# Patient Record
Sex: MALE | Race: WHITE
[De-identification: names, ages, dates, MRNs, and addresses within clinical notes are randomized per-mention and may not be internally consistent; named-entity substitution may affect disease eponyms.]

---

## 2017-03-31 NOTE — GOP
[f 
rep st]



                                                                OPERATIVE REPORT





DATE OF OPERATION:  03/31/2017



SURGEON:  Jai Gonzalez MD



CO-SURGEON:  Blanco Alonso MD



ASSISTANT:  Ania Faria PA-C



ANESTHESIA:  General.



PREOPERATIVE DIAGNOSIS:  

1.  History of lumbar spinal fusion L3-S1 with pseudoarthrosis at L5-S1.

2.  Recurrent disc herniation with foraminal stenosis left-sided L5-S1.

3.  Intractable back pain and left lower extremity radiculopathy.

4.  Treatment refractory to nonoperative intervention.



POSTOPERATIVE DIAGNOSIS:  

1.  History of lumbar spinal fusion L3-S1 with pseudoarthrosis and hardware 
failure at L5-S1.

2.  Recurrent disc herniation with foraminal stenosis left-sided L5-S1.

3.  Intractable back pain and left lower extremity radiculopathy.

4.  Treatment refractory to nonoperative intervention.



PROCEDURE PERFORMED:  

1.  Re-do anterior L5-S1 lumbar diskectomy with exploration of spinal fusion, 
removal of interbody cage and Interbody fusion using an 18 mm, 12 degree 
perimeter PEEK cage filled with morselized autograft.

2.  Anterior lumbar fusion L5-S1 with a Medtronic Pivox plate.

3.  Use of intraoperative fluoroscopy, less than 1 hour physician time.

4.  Use of neuromonitoring.



FINDINGS:  Per imaging.



SPECIMENS:  The interbody cage was sent to Pathology for gross specimen.



ESTIMATED BLOOD LOSS:  100 mL.



INDICATIONS:  The patient is a 52-year-old gentleman who has undergone a prior 
L3-S1 fusion back in 2014.  He did well for some time and then noted worsening 
leg pain with back pain.  Imaging studies demonstrated a pseudoarthrosis at L5-
S1 with loose hardware and a recurrent disk herniation at the L5-S1 level on 
the left side with foraminal and nerve compression.  After discussion of the 
risks, benefits, and treatment alternatives, and after failing nonoperative 
intervention, we decided to proceed forth with the surgery as described above.



DESCRIPTION OF PROCEDURE:  Patient was brought to the operating theater and 
underwent general endotracheal anesthesia without complications.  He had 
Venodyne's, SHERI hose, and appropriate lines placed by Anesthesia.  The patient 
was maintained supine on the operating room table and a small bump placed under 
the small of his lumbar spine.  Using lateral fluoroscopy, Dr. Blanco Alonso 
and his team marked out an anterior abdominal incision which would give us the 
best approach to the L5-S1 level.  This was marked.  The anterior abdomen was 
then prepped and draped in usual sterile surgical fashion.  A time-out was 
completed per protocol.  The patient received antibiotics within 1 hour of 
incision. 



Dr. Alonso and his team will then dictate in a separate operative report the 
anterior approach to the L5-S1 level.  Once we confirmed the level, our team 
was called into the surgical room.  At this point, we completed a re-do L5-S1 
diskectomy with the use of the 11 blade and curettes and Kerrison punches.  We 
explored the fusion and noted that the interbody cage was in broken in several 
fragments.  We removed the previous fragmented cage and sent it off to 
Pathology for gross analysis.  We then completed a discectomy and prepared the 
cartilaginous endplates.  We measured the interbody space and placed an 18 mm, 
12 degree perimeter PEEK cage filled with morselized autograft into the L5-S1 
disk space.  We achieved excellent distraction of the foramina bilaterally in 
comparison to his pre-imaging films.  At this point, we secured a large 
Medtronic Pivox plate onto the vertebral bodies of L5 and S1 with 1 screw into 
L5 and 1 screw into S1.  There was a large osteophyte on the inferior aspect of 
the L5 vertebral body near the endplate, but I could not drill this down 
secondary to the fact that the vasculature was lying over the osteophyte and 
was tethered to this area by scar and inflammatory tissue.  AP and lateral x-
rays demonstrated excellent placement of the hardware.  



At this point, Dr. Alonso his team will dictate in a separate op report the 
abdominal closure. 



There were no complications and neuromonitoring was stable throughout.



COMPLICATIONS:  None.









Job #:  [id number]

MTDD

## 2017-03-31 NOTE — POSTOPPROG
Post Op Note


Date of Operation: 03/31/17


Surgeon: Jai Gonzalez


Assistant: SHANE Faria PAC


Anesthesia: GET(General Endotracheal)


Pre-op Diagnosis: Pseudoarthrosis, lumbar stenosis


Post-op Diagnosis: Pseudoarthrosis, lumbar stenosis


Indication: failure of medical management, pseudoarthrosis, lumbar stenosis


Procedure: L5/S1 ALIF


Inf/Abcess present in the surg proc area at time of surgery?: No


EBL: Minimal





PA Addendum





- Addendum


.: 





S: leg pain improved





O: NAD A&Ox3 MAEx4 5/5 and equal in BUE and BLE Abd soft, dressing c/d/i





A/P 53y/o male s/p L5/S1 ALIF


-Diet per general surgery


-Optimize pain management


-PT/OT


-DVT prophx: TEDs, SCDs, Lovenox okay POD1


-Post op xrays pending


-Please notify NS with any change in neuro/motor exam

## 2017-04-01 NOTE — SOAPPROG
SOAP Progress Note


Assessment/Plan: 


Assessment:


 status post anterior spine exposure L5-S1 / wound okay/ abdomen soft / 

negative flatus























Plan: clear liquids





04/01/17 08:19





Objective: 





 Vital Signs











Temp Pulse Resp BP Pulse Ox


 


 37.4 C   82   16   166/94 H  93 


 


 04/01/17 04:57  04/01/17 04:57  04/01/17 04:57  04/01/17 04:57  04/01/17 04:57








 Laboratory Results





 03/02/17 10:52 





 03/02/17 10:52 





 











 03/31/17 04/01/17 04/02/17





 05:59 05:59 05:59


 


Intake Total  5170 


 


Output Total  3500 


 


Balance  1670 














ICD10 Worksheet


Patient Problems: 


 Problems











Problem Status Onset


 


BRIAN (acute kidney injury) Acute  


 


Arthrodesis status Acute  


 


Diabetes Acute  


 


HTN (hypertension) Acute  


 


Lumbago due to displacement of intervertebral disc Acute  


 


Lumbar stenosis Acute  


 


Pain Acute

## 2017-04-01 NOTE — SOAPPROG
SOAP Progress Note


Assessment/Plan: 


Assessment:


POD1 s/p L5-S1 ALIF


-Xrays


-pain control


-diet per gen surgery


-hold lovenox at midnight sunday for surgery monday


-PT/OT


-TEDS/SCD's, lovenox


Please call with change in neuro status





























04/01/17 10:25





Subjective: 





Doing well, leg pain completely resolved, pain well controlled, No complaints 

this AM


Objective: 





 Vital Signs











Temp Pulse Resp BP Pulse Ox


 


 36.4 C   72   16   99/52 L  93 


 


 04/01/17 08:19  04/01/17 08:19  04/01/17 08:19  04/01/17 08:19  04/01/17 08:19








 Laboratory Results





 03/02/17 10:52 





 03/02/17 10:52 





 











 03/31/17 04/01/17 04/02/17





 05:59 05:59 05:59


 


Intake Total  5170 


 


Output Total  3500 


 


Balance  1670 








AandOx3, wearing CPAP, MAEWx 4 5/5





ICD10 Worksheet


Patient Problems: 


 Problems











Problem Status Onset


 


BRIAN (acute kidney injury) Acute  


 


Arthrodesis status Acute  


 


Diabetes Acute  


 


HTN (hypertension) Acute  


 


Lumbago due to displacement of intervertebral disc Acute  


 


Lumbar stenosis Acute  


 


Pain Acute

## 2017-04-01 NOTE — GOP
[f 
rep st]



                                                                OPERATIVE REPORT





DATE OF OPERATION:  03/31/2017



SURGEON:  Blanco Alonso MD



ANESTHESIA:  General endotracheal anesthesia.



ANESTHESIOLOGIST:  Dr. Chaudhari.



PREOPERATIVE DIAGNOSIS:  Spinal instability with pseudoarthrosis L5-S1 joint.



POSTOPERATIVE DIAGNOSIS:  Spinal instability with pseudoarthrosis L5-S1 joint.



PROCEDURE PERFORMED:  Anterior spine exposure of the L5-S1 joint.



FINDINGS:  



ESTIMATED BLOOD LOSS:  Negligible from my portion of the procedure.



DESCRIPTION OF PROCEDURE:  The patient was taken to the operating room and 
received a satisfactory general endotracheal anesthesia by Dr. Chaudhari.  He was 
placed in the supine position, prepped and draped in the usual sterile fashion.
  A Pfannenstiel-type lower abdominal incision was made and carried through the 
subcutaneous tissue.  Rectus sheath was incised bilaterally, and the rectus 
sheath was elevated up off the rectus muscles above and below the incision.  
The peritoneum was then entered in the midline, and the rectus muscles were 
retracted laterally.  The small bowel and colon were packed away.  The 
retroperitoneum was entered over the sacral prominence.  The sacral vessels 
were multiply hemoclipped and divided, and the L5-S1 joint space was identified 
fluoroscopically, and then further exposed.  The left common iliac vein was 
mobilized and retracted cephalad, as was the right iliac artery.  Adequate 
exposure was achieved using the Omni retractor for surgery on the anterior disk 
space.  Hemostasis was assured.  



The case was then turned over to Dr. Gonzalez for anterior fusion.  That was 
completed.  



We then closed the retroperitoneum with a running 2-0 Vicryl suture.  The 
peritoneum was closed with a running 0 Vicryl suture.  The fascia was closed 
with #1 PDS suture for the rectus sheath.  The skin was closed with skin 
staples.  The wound was infiltrated with 0.5% Marcaine. 



He tolerated the procedure quite well.



COMPLICATIONS:  No complications.



DISPOSITION:  He was taken to the recovery room in good condition.





Job #:  [id number]

MTDD

## 2017-04-02 NOTE — SOAPPROG
SOAP Progress Note


Assessment/Plan: 


Assessment:


POD2 s/p L5-S1 ALIF


-Xrays


-pain control: well controlled


-diet per gen surgery: appreciate management


-hold lovenox at midnight sunday for surgery monday


-PT/OT


-TEDS/SCD's, lovenox


Please call with change in neuro status





























04/01/17 10:25





04/02/17 10:31





Subjective: 





Doing well, pain well controlled, denies leg pain, no new complaints


Objective: 





 Vital Signs











Temp Pulse Resp BP Pulse Ox


 


 37.7 C   88   16   126/60 H  90 L


 


 04/02/17 09:23  04/02/17 09:23  04/02/17 09:23  04/02/17 09:23  04/02/17 09:23








 Laboratory Results





 03/02/17 10:52 





 03/02/17 10:52 





 











 04/01/17 04/02/17 04/03/17





 05:59 05:59 05:59


 


Intake Total 5170 750 


 


Output Total 3500 800 


 


Balance 1670 -50 








sleeping but easily arousable, AandOx3, CNII-XII intact, MAEWx4 5/5





- Pending Discharge


Pending Discharge Within 24 Hours: No





ICD10 Worksheet


Patient Problems: 


 Problems











Problem Status Onset


 


BRIAN (acute kidney injury) Acute  


 


Arthrodesis status Acute  


 


Diabetes Acute  


 


HTN (hypertension) Acute  


 


Lumbago due to displacement of intervertebral disc Acute  


 


Lumbar stenosis Acute  


 


Pain Acute

## 2017-04-02 NOTE — SOAPPROG
SOAP Progress Note


Assessment/Plan: 


Assessment:


s/p exposure for alif


Some flatus


Very sleepy


Can advance diet when more alert and passing more flatus





S: Napping, awakes but falls asleep again


CPAP in place


BS hypoactive, brace in place.  Not tender























Plan:





04/02/17 11:51





Objective: 





 Vital Signs











Temp Pulse Resp BP Pulse Ox


 


 37.7 C   88   16   126/60 H  90 L


 


 04/02/17 09:23  04/02/17 09:23  04/02/17 09:23  04/02/17 09:23  04/02/17 09:23








 Laboratory Results





 03/02/17 10:52 





 03/02/17 10:52 





 











 04/01/17 04/02/17 04/03/17





 05:59 05:59 05:59


 


Intake Total 5170 750 


 


Output Total 3500 800 


 


Balance 1670 -50 














ICD10 Worksheet


Patient Problems: 


 Problems











Problem Status Onset


 


BRIAN (acute kidney injury) Acute  


 


Arthrodesis status Acute  


 


Diabetes Acute  


 


HTN (hypertension) Acute  


 


Lumbago due to displacement of intervertebral disc Acute  


 


Lumbar stenosis Acute  


 


Pain Acute

## 2017-04-03 NOTE — SOAPPROG
SOAP Progress Note


Assessment/Plan: 


Assessment:





51yo male s/p anterior ab exposure for spine surgery





Tolerating clears but only taking in small amount because difficult to sit up 

given recent spine surgery. Passing gas. 





PE


in bed, laying flat


abdomen mild distension, staple line clean dry intact, nontender to palpation





Plan:


ADAT


gen surgery will sign off, call if any issues arise, staples out approx 12 days 

from surgery in our office or NSG. 





04/03/17 11:08





Objective: 





 Vital Signs











Temp Pulse Resp BP Pulse Ox


 


 37.2 C   84   16   104/57 L  91 L


 


 04/03/17 07:46  04/03/17 07:46  04/03/17 07:46  04/03/17 08:08  04/03/17 07:46








 Laboratory Results





 03/02/17 10:52 





 03/02/17 10:52 





 











 04/02/17 04/03/17 04/04/17





 05:59 05:59 05:59


 


Intake Total 750 250 


 


Output Total 800  


 


Balance -50 250 














ICD10 Worksheet


Patient Problems: 


 Problems











Problem Status Onset


 


BRIAN (acute kidney injury) Acute  


 


Arthrodesis status Acute  


 


Diabetes Acute  


 


HTN (hypertension) Acute  


 


Lumbago due to displacement of intervertebral disc Acute  


 


Lumbar stenosis Acute  


 


Pain Acute

## 2017-04-03 NOTE — NEUSURGPN
Assessment/Plan: 





POD3 s/p L5-S1 ALIF


-pain control: well controlled


-diet per gen surgery: appreciate management


-hold lovenox  for surgery tomorrow


-PT/OT


-TEDS/SCD's, lovenox


Please call with change in neuro status





Subjective: 


Denies any leg pain


Objective: 


NAD A&Ox3 MAEx4 5/5 and equal in BUE and BLE.


Catheter Insertion Date: 03/31/17





- Physician


Patient Seen by : Carlos





Neurosurgery Physical Exam





- Vitals, I&O, Labs





 I and O











 04/02/17 04/03/17 04/04/17





 05:59 05:59 05:59


 


Intake Total 750 250 


 


Output Total 800  


 


Balance -50 250 


 


Intake:   


 


  Oral (ml) 750 250 


 


Output:   


 


  Urine (ml) 800  


 


    Catheter 800  


 


Other:   


 


  Intake Quantity Yes Yes 





  Sufficient   


 


  Number of Voids   


 


    Catheter 2  


 


    Incontinence 1  


 


    Toilet  2 


 


  Bladder Scan Volume (ml)   


 


    Catheter 361  








 Vital Signs











Temp Pulse Resp BP Pulse Ox


 


 37.2 C   84   16   102/54 L  91 L


 


 04/03/17 07:46  04/03/17 07:46  04/03/17 07:46  04/03/17 07:46  04/03/17 07:46








 Laboratory Results





 03/02/17 10:52 





 03/02/17 10:52 











ICD10 Worksheet


Patient Problems: 


 Problems











Problem Status Onset


 


BRIAN (acute kidney injury) Acute  


 


Arthrodesis status Acute  


 


Diabetes Acute  


 


HTN (hypertension) Acute  


 


Lumbago due to displacement of intervertebral disc Acute  


 


Lumbar stenosis Acute  


 


Pain Acute

## 2017-04-04 NOTE — NEUSURGPN
Assessment/Plan: 





- I spoke with patient's ALICIA Roche over the phone and discussed the 2 

options for her father Delvis. Option 1 being let him recover here in the 

hospital and once psychosis has resolved, discharge him and bring him back for 

the stage 2 lumbar surgery in the next few weeks. Option 2 would be to proceed 

with surgery today as planned. Dr. Chaudhary of anesthesia feels it would be OK for 

Delvis to undergo general anesthesia given that his labs are ok and HCT did 

not reveal any concerning findings.





-Kaley has chosen to proceed with surgery today. Verbal consent obtained over 

the phone. Patient's mother and also aide in room witnessed. We will proceed 

with the stage 2 lumbar surgery this afternoon. D/w Dr Gonzalez as well.


Catheter Insertion Date: 04/04/17





- Physician


Discussed Patient with Dr.: Gonzalez





Neurosurgery Physical Exam





- Vitals, I&O, Labs





 I and O











 04/03/17 04/04/17 04/05/17





 05:59 05:59 05:59


 


Intake Total 250 400 


 


Output Total  2320 1600


 


Balance 250 -1920 -1600


 


Intake:   


 


  Oral (ml) 250 400 


 


Output:   


 


  Urine (ml)  2320 1600


 


    Catheter  2320 1600


 


Other:   


 


  Intake Quantity Yes Yes 





  Sufficient   


 


  Number of Voids   


 


    Catheter   1


 


    Toilet 2  


 


  Number of Stools   


 


    Toilet  1 








 Vital Signs











Temp Pulse Resp BP Pulse Ox


 


 37.0 C   87   18   151/78 H  92 


 


 04/04/17 11:44  04/04/17 11:44  04/04/17 11:44  04/04/17 11:44  04/04/17 11:44








 Laboratory Results





 04/04/17 07:50 





 04/04/17 06:45 











ICD10 Worksheet


Patient Problems: 


 Problems











Problem Status Onset


 


BRIAN (acute kidney injury) Acute  


 


Arthrodesis status Acute  


 


Diabetes Acute  


 


HTN (hypertension) Acute  


 


Lumbago due to displacement of intervertebral disc Acute  


 


Lumbar stenosis Acute  


 


Pain Acute

## 2017-04-04 NOTE — POSTOPPROG
Post Op Note


Date of Operation: 04/04/17


Surgeon: Diane Marshall


Assistant: PRINCE Marshall PA-C


Anesthesiologist: Dr Alexei Chaudhary


Anesthesia: GET(General Endotracheal)


Pre-op Diagnosis: lumbar pseudoarthrosis, adjacent level breakdown


Post-op Diagnosis: same


Indication: hardware failure, pain, non-union


Procedure: L4-S1 hardware removal, L34 TLIF, L3-S1 posterior fusion


Findings: loose S1 hardware, DDD


Inf/Abcess present in the surg proc area at time of surgery?: No


Depth: Organ Space


EBL: 100-500


Complications: 





none


Drains: Shaquille Harry (x1)


Specimen(s): 





none





PA Addendum





- Addendum


.: 





S: Pt awake in PACU, c/o back pain





O:


Awake but sleepy


NAD


VSS


MAEx4


Motor 5/5 BUE/BLE - poor effort


A & P incisions dressed cdi


JPx1


Ha in 





A: 51 yo M s/p L4-S1 hardware removal, L34 TLIF, L3-S1 posterior fusion





P:


PT/OT


Pain management


Brace when OOB


Post op xrays pending


DC ha in AM


Follow BRANDON output


Advance diet as tolerated


TEDs, SCDs, lovenox POD#1


Call NS with any issues

## 2017-04-04 NOTE — NEUSURGPN
Assessment/Plan: 





POD4 s/p L5-S1 ALIF


-pain control: well controlled


- patient with some confusion this morning and hallucinations.  He is non-focal 

and able to communicate but sleepy throughout the conversation.  Appears to be 

over medicated.  The patient's mother and anesthesia agree.  The one medication 

that was given this morning was Valium and may be contributing.


- We will check STAT head CT to ensure no stroke or other intracranial issues., 

as well as a UA and CBC.   His lytes appears to be fine.


- We will delay his surgery this morning and plan for later this afternoon and 

see if his confusion clears up.


- 





Subjective: 


Denies any leg pain


Objective: 


NAD A&Ox3 MAEx4 5/5 and equal in BUE and BLE.


Confused to date and location but is able to recognize his mother.  


Exam is non-focal.


Urinary Catheter in Place: Yes


Urinary Catheter Indication: Other (Use Comment) (to OR for stage II surgery.)


Catheter Insertion Date: 04/04/17





- Physician


Patient Seen by : Carlos





Neurosurgery Physical Exam





- Vitals, I&O, Labs





 I and O











 04/03/17 04/04/17 04/05/17





 05:59 05:59 05:59


 


Intake Total 250 400 


 


Output Total  2320 


 


Balance 250 -1920 


 


Intake:   


 


  Oral (ml) 250 400 


 


Output:   


 


  Urine (ml)  2320 


 


    Catheter  2320 


 


Other:   


 


  Intake Quantity Yes Yes 





  Sufficient   


 


  Number of Voids   


 


    Toilet 2  


 


  Number of Stools   


 


    Toilet  1 








 Vital Signs











Temp Pulse Resp BP Pulse Ox


 


 37.3 C   98   16   149/90 H  94 


 


 04/03/17 23:23  04/04/17 03:54  04/04/17 03:54  04/04/17 03:54  04/04/17 03:54








 Laboratory Results





 04/04/17 06:45 





 04/04/17 06:45 











ICD10 Worksheet


Patient Problems: 


 Problems











Problem Status Onset


 


BRIAN (acute kidney injury) Acute  


 


Arthrodesis status Acute  


 


Diabetes Acute  


 


HTN (hypertension) Acute  


 


Lumbago due to displacement of intervertebral disc Acute  


 


Lumbar stenosis Acute  


 


Pain Acute

## 2017-04-05 NOTE — NEUSURGPN
Date of Surgery: 04/04/17


Post Op Day: 1


Assessment/Plan: 


Assessment: 53 yo M s/p L4-S1 hardware removal, L3/4 TLIF, L3-S1 posterior 

fusion  POD #1





Plan:


-s/p TLIF L3-S1: pt states expected lower back pain, legs feel fine


-no confused this am, AAO x 4


-PT/OT-CPM


-Pain management


-Brace when OOB


-Post op xrays pending


-DC ha this AM


-Follow BRANDON output


-Advance diet as tolerated


-TEDs, SCDs, lovenox POD#1


-Call NS with any issues





Subjective: 


Awake and alert.  NAD.  Eating/drinking and voiding.  No f/c/n/v/d.  


Objective: 


Awake and alert.  AAO x 4, NAD


AFVSS/MAEx4


Motor 5/5 BUE/BLE


A & P incisions dressed cdi


JPx1


Ha in-to be removed this am


Neuro Check Frequency: per routine


Urinary Catheter in Place: Yes


Urinary Catheter Indication: Other (Use Comment) (to be removed this am)


Catheter Insertion Date: 04/04/17





- Physician


Discussed Patient with : Carlos





Neurosurgery Physical Exam





- Vitals, I&O, Labs





 I and O











 04/04/17 04/05/17 04/06/17





 05:59 05:59 05:59


 


Intake Total 400 3550 


 


Output Total 2320 3855 


 


Balance -1920 -305 


 


Intake:   


 


  Oral (ml) 400 1000 


 


  IV Intake (ml)  1750 


 


  IV Infused (ml)  800 


 


    NS W/ 20 KCl/L 1,000 ml @  700 





    75 mls/hr IV CONT NUBIA Rx   





    #:[id number]   


 


    ceFAZolin 1 GM/DEXTROSE  100 





    50 ml @ 200 mls/hr IV Q8H   





    NUBIA Rx#:[id number]   


 


Output:   


 


  Urine (ml) 2320 3350 


 


    Catheter 2320 3350 


 


  Estimated Blood Loss (ml)  100 


 


  Wound Drainage (ml)  405 


 


    Left Back Shaquille Harry  405 


 


Other:   


 


  Intake Quantity Yes  





  Sufficient   


 


  Number of Voids   


 


    Catheter  1 


 


  Number of Stools   


 


    Toilet 1  








 Vital Signs











Temp Pulse Resp BP Pulse Ox


 


 36.9 C   90   17   146/85 H  97 


 


 04/05/17 04:00  04/05/17 04:00  04/05/17 04:00  04/05/17 04:00  04/05/17 04:00








 Laboratory Results





 04/05/17 07:10 











ICD10 Worksheet


Patient Problems: 


 Problems











Problem Status Onset


 


BRIAN (acute kidney injury) Acute  


 


Arthrodesis status Acute  


 


Diabetes Acute  


 


HTN (hypertension) Acute  


 


Lumbago due to displacement of intervertebral disc Acute  


 


Lumbar stenosis Acute  


 


Pain Acute

## 2017-04-05 NOTE — GOP
[f 
rep st]



                                                                OPERATIVE REPORT





DATE OF OPERATION:  04/04/2017



SURGEON:  Jai Gonzalez MD



ASSISTANT:  First rodriguez, Diane Marshall, SARANYA.



ANESTHESIA:  General.



PREOPERATIVE DIAGNOSIS:  

1.  L5-S1 pseudoarthrosis, status post prior L4-S1 fusion.

2.  Low back pain.

3.  Left lower extremity radiculopathy.

4.  Treatment refractory to nonoperative intervention.



POSTOPERATIVE DIAGNOSIS:  

1.  L5-S1 pseudoarthrosis, status post prior L4-S1 fusion.

2.  Low back pain.

3.  Left lower extremity radiculopathy.

4.  Treatment refractory to nonoperative intervention.

5.  Hardware failure.



SURGERY:  Please note this is stage II of a 2-staged planned surgery.  Stage I 
was an anterior lumbar interbody revision surgery at L5-S1.  He now presents 
for posterior fusion stabilization.



PROCEDURE PERFORMED:  

1.  Posterior arthrodesis with approach at L3, L4, L5, and S1.

2.  Posterolateral fusion with new bilateral pedicle screw placement into L3 
from the [employer] Solera system.

3.  Removal and replacement of bilateral pedicle screws in L4, L5, S1 from the 
[employer] Solera system.

4.  Posterolateral fusion on the right between L3 and L4 with morselized 
autograft and allograft.

5.  Left-sided L3-L4 hemilaminotomy with mesial facetectomy, foraminotomy, and 
nerve decompression.

6.  Left-sided L3-L4 transforaminal lumbar interbody fusion with a 10 x 20 mm 
titanium PEEK elevate cage with morselized autograft and allograft.

7.  Exploration of prior lumbar fusion, L4 through S1.

8.  Use of intraoperative 3D Stealth navigation.

9.  Use of intraoperative fluoroscopy, less than 1-hour physician time.

10.  Use of neuromonitoring.

11.  Use of the operating microscope.

12.  Injection of preservative-free intrathecal narcotics.

13.  Placement of bone cement around the left S1 pedicle screw.



FINDINGS:  There was malfunction of the hardware on the right side at L5-S1 
level, with pseudoarthrosis.



SPECIMENS:  None.



ESTIMATED BLOOD LOSS:  150 mL.



INDICATIONS:  The patient is a 52-year-old gentleman, who has undergone a prior 
L4 through S1 fusion several years ago.  He presented with worsening left lower 
extremity radiculopathy with low back pain.  He was found to have a 
pseudoarthrosis at L5-S1 with loose hardware.  Plan was to undergo a redo 
anterior lumbar interbody fusion at L5-S1 as a staged procedure, which was 
completed several days ago.  He presents now for the second stage.  The patient 
was noted to be somewhat confused prior to induction of anesthesia for stage II
; however, we spoke extensively with the patient's POA as well as his mother, 
and felt that this was secondary to the patient not being able to take his 
psychiatric medications as scheduled from his prior n.p.o. status and anterior 
lumbar surgery.  We decided to proceed forth with surgical intervention as 
described above.  The patient already consented for the procedure on the day 
before, when he was lucid.



DESCRIPTION OF PROCEDURE:  Patient brought to the operating theater and 
underwent general endotracheal anesthesia without complications.  He had 
Venodynes, SHERI hose, and appropriate lines placed.  He was flipped prone onto 
the Shaquille table and all bony processes inspected and padded.  The previous 
lumbar incision was identified, and prepped and draped in the usual sterile 
surgical fashion.  A time-out was completed per protocol, and the patient 
received antibiotics within 1 hour of incision. 



Using lateral fluoroscopy and a spinal needle, we then picked our entry point 
at the L3 through S1 levels.  This was marked in the midline.  The incision was 
then infiltrated with Marcaine with epinephrine.  The incision was taken down 
through with the scalpel blade.  Then using the monopolar, the incision was 
taken down in the midline through the lumbodorsal fascia, to the spinous 
process of L3.  We then skived off laterally and completed a subperiosteal 
dissection at L3-L4, and identified the prior hardware at the L4 levels.  We 
then exposed the remainder of the hardware at L4, L5, and S1 bilaterally.  The 
right-sided S1 cap screw was noted to be floating in the soft tissues, and this 
was subsequently removed.  We then explored the hardware, which was noted to be 
consistent with a pseudoarthrosis at the L5-S1 level.  At this point, we 
sequentially removed the cap screws from the bilateral L4, L5, and left S1 
levels, as well as the bilateral rods from the L4-S1 levels.  We then 
sequentially removed the bilateral pedicle screws from L4, L5, and S1.  The 
bilateral S1 screws were noted to be quite loose consistent with the haloing on 
his CT scan, and we were able to pull these out easily.  At this point, we 
replaced the bilateral L4 screws with 7.5 x 45 mm screws, as well as 
bilaterally at L5 with 7.5 x 45 mm screws.  We then placed an 8.5 x 45 mm screw 
on the right S1.  We used bone cement from the [employer] system and injected it 
into the left S1 screw hole, at which point we placed a 9.5 x 40 mm screw into 
the left S1 hole.  



At this point, we attached the 3D Stealth navigation clamp to the spinous 
process of L4 and completed a 3D Stealth navigation spin.  Using 3D Stealth 
navigation, we placed the  holes for the bilateral L3 pedicle screws.  
Both holes were manually palpated with evidence of  any cortical breaches.  We 
placed a 6.5 x 60 mm screw in the left L3, and a 6.5 x 50 mm screw into the 
right L3.  Another 3D Stealth navigation spin demonstrated good placement of 
the hardware.  Next, the microscope was brought into the field to assist with 
microscopic dissection and to maintain illumination and magnification.  Using 
the bur tip on the drill bit, Leksell rongeur, and Kerrison punches, we 
completed a left-sided L3-L4 hemilaminotomy with mesial facetectomy and 
resection of the pars, as well as foraminotomy.  We distracted the L3-L4 disk 
space and completed a left-sided L3-L4 diskectomy.  We prepared the 
cartilaginous endplates and measured interbody space.  We placed a 10 x 20 mm 
titanium PEEK elevated cage filled with morselized autograft and allograft 
anteriorly and towards the midline.  We packed additional morcellized autograft 
into the disk space for the interbody fusion.  



We let down distraction and decorticated the bone on the right side between L3 
and L4.  We placed 2 lordotic rods into the heads of the screws between L3 and 
S1 and secured them down with cap screws which were tightened to the 
's setting.  We placed morselized autograft and allograft on the 
right side between L3 and L4 for the posterolateral fusion.  We irrigated the 
wound copiously with bacitracin irrigation, and injected preservative-free 
intrathecal narcotics.  A drain was left in the subfascial space, and the wound 
then closed in multiple layers using Vicryl sutures in the deep layers and 
Dermabond for the skin.  The patient's wounds were dressed sterilely.  He was 
then flipped supine onto the transfer cart, and was still asleep at the time of 
this dictation.  



There were no complications and no noted changes on neuromonitoring throughout 
the procedure.



COMPLICATIONS:  None.





Job #:  [id number]

PILI

## 2017-04-06 NOTE — NEUSURGPN
Assessment/Plan: 





Assessment: 51 yo M s/p L4-S1 hardware removal, L3/4 TLIF, L3-S1 posterior 

fusion  POD #2





Plan:


-s/p TLIF L3-S1: pt states expected lower back pain, legs feel fine


-no confused this am, AAO x 4


-PT/OT-CPM


-Pain management


-Brace when OOB


-Post op xrays show stable hardware


-Follow BRANDON output - likely remove this afternoon


-Advance diet as tolerated


-TEDs, SCDs, on lovenox 


-Call NS with any issues


-D/w Dr Gonzalez this am. Pt seen by Dr Gonzalez as well this AM.





Subjective: 


Pt resting in bedside chair. Remembers me from the office. States he is doing 

well. No leg symptoms.


Objective: 


AAOx3


NAD


VSS


MAEx4


Motor 5/5 BLE


Incision dressed - some bloody dc on dressing. 


JPx1


Urinary Catheter in Place: No


Catheter Insertion Date: 04/04/17





- Physician


Discussed Patient with : Carlos


Patient Seen by : Carlos





Neurosurgery Physical Exam





- Vitals, I&O, Labs





 I and O











 04/05/17 04/06/17 04/07/17





 05:59 05:59 05:59


 


Intake Total 3550 1650 


 


Output Total 3855 1070 


 


Balance -305 580 


 


Intake:   


 


  Oral (ml) 1000 1650 


 


  IV Intake (ml) 1750  


 


  IV Infused (ml) 800  


 


    NS W/ 20 KCl/L 1,000 ml @ 700  





    75 mls/hr IV CONT NUBIA Rx   





    #:[id number]   


 


    ceFAZolin 1 GM/DEXTROSE 100  





    50 ml @ 200 mls/hr IV Q8H   





    NUBIA Rx#:[id number]   


 


Output:   


 


  Urine (ml) 3350 900 


 


    Catheter 3350  


 


    Toilet  900 


 


  Estimated Blood Loss (ml) 100  


 


  Wound Drainage (ml) 405 170 


 


    Left Back Shaquille Harry 405 170 


 


Other:   


 


  Number of Voids   


 


    Catheter 1  


 


    Toilet  2 








 Vital Signs











Temp Pulse Resp BP Pulse Ox


 


 37.9 C   77   16   134/65 H  91 L


 


 04/06/17 04:00  04/06/17 04:00  04/06/17 04:00  04/06/17 04:00  04/06/17 04:00








 Laboratory Results





 04/05/17 07:10 





 04/05/17 07:10 











ICD10 Worksheet


Patient Problems: 


 Problems











Problem Status Onset


 


BRIAN (acute kidney injury) Acute  


 


Arthrodesis status Acute  


 


Diabetes Acute  


 


HTN (hypertension) Acute  


 


Lumbago due to displacement of intervertebral disc Acute  


 


Lumbar stenosis Acute  


 


Pain Acute

## 2017-04-07 NOTE — NEUSURGPN
Assessment/Plan: 





Assessment: 51 yo M s/p L4-S1 hardware removal, L3/4 TLIF, L3-S1 posterior 

fusion  POD #3





Plan:


-s/p TLIF L3-S1: pt states expected lower back pain, legs feel fine


-no confused this am, AAO x 4


-PT/OT-CPM


-Pain management


-Brace when OOB - getting new brace fit, his old one from a prior sx is too 

small


-Post op xrays show stable hardware


-Daily dressing changes


-Advance diet as tolerated


-TEDs, SCDs, on lovenox 


-Call NS with any issues


-D/w Dr Gonzalez this am. 


-Dispo: OK for DC to rehab/SNF once placement obtained





Subjective: 


Pt resting in bed, states he is just waking up. Pain is well managed. Agrees 

his brace is too small and ok with getting a new one.


Objective: 


AAOx3


NAD


VSS


MAEx4


Motor 5/5 BLE


A & P incisions dressed


+LT


Urinary Catheter in Place: No


Catheter Insertion Date: 04/04/17





- Physician


Discussed Patient with : Carlos





Neurosurgery Physical Exam





- Vitals, I&O, Labs





 I and O











 04/06/17 04/07/17 04/08/17





 05:59 05:59 05:59


 


Intake Total 1650 500 


 


Output Total 1070 820 


 


Balance 580 -320 


 


Intake:   


 


  Oral (ml) 1650 500 


 


Output:   


 


  Urine (ml) 900 800 


 


    Toilet 900 800 


 


  Wound Drainage (ml) 170 20 


 


    Left Back Shaquille Harry 170 20 


 


Other:   


 


  Intake Quantity  Yes 





  Sufficient   


 


  Number of Voids   


 


    Toilet 2 2 


 


  Number of Stools   


 


    Toilet  1 








 Microbiology











 04/04/17 19:30 Urine Culture - Final





 Urine,Catheterized 








 Vital Signs











Temp Pulse Resp BP Pulse Ox


 


 37.2 C   79   16   120/67   95 


 


 04/07/17 04:00  04/07/17 04:00  04/07/17 04:00  04/07/17 04:00  04/07/17 04:00








 Laboratory Results





 04/05/17 07:10 





 04/05/17 07:10 











ICD10 Worksheet


Patient Problems: 


 Problems











Problem Status Onset


 


BRIAN (acute kidney injury) Acute  


 


Arthrodesis status Acute  


 


Diabetes Acute  


 


HTN (hypertension) Acute  


 


Lumbago due to displacement of intervertebral disc Acute  


 


Lumbar stenosis Acute  


 


Pain Acute

## 2017-04-07 NOTE — PDIAF
- Diagnosis


Code Status: Full Code





- Medication Management


Discharge Medications: 


 Medications to Continue on Transfer





Ascorbic Acid [Vitamin C 500 mg (*)] 1,000 mg PO DAILY@0700 09/17/15 [Last 

Taken 03/23/17]


Benztropine Mesylate [Cogentin] 0.5 mg PO BID@0700,2100 09/17/15 [Last Taken 03/ 30/17]


Fluticasone Nasal [Flonase Nasal Spray] 1 sprays EACHNARE DAILY PRN 09/17/15 [

Last Taken 10/06/15]


Gabapentin [Neurontin 300 MG (*)] 600 mg PO TID@0700,1400,2100 09/17/15 [Last 

Taken 03/30/17]


Metoprolol Tartrate [Lopressor 100 mg (*)] 100 mg PO BID@07,18 09/17/15 [Last 

Taken 03/30/17]


Propylene Glycol [Systane Balance] 1 drop EACHEYE PRN PRN 09/17/15 [Last Taken 

03/23/17]


amLODIPine BESYLATE [Norvasc 10 mg (*)] 10 mg PO DAILY@07 09/17/15 [Last Taken 

03/30/17]


clonazePAM [Klonopin (*)] 0.5 mg PO TID@0700,1400,1800 09/17/15 [Last Taken 03/ 30/17]


tiZANidine HCL [Zanaflex 2MG (*)] 4 mg PO BID@0700,2100 09/17/15 [Last Taken 03/ 30/17]


Doxazosin Mesylate [Cardura 4 MG (*)] 8 mg PO HS #30 tab 10/09/15 [Last Taken 03 /30/17]


Allopurinol [Allopurinol 300 MG (RX)] 300 mg PO BID@07,18 02/17/17 [Last Taken 

03/30/17]


Amantadine HCl [Symmetrel] 100 mg PO HS 02/17/17 [Last Taken 03/30/17]


Atorvastatin Calcium [Lipitor 20 mg (*)] 20 mg PO HS 02/17/17 [Last Taken 03/30/ 17]


Colchicine [Colchicine (*)] 1.8 mg PO DAILY PRN 02/17/17 [Last Taken 03/30/17]


Dextroamphetamine/Amphetamine [Adderall 30 mg Tablet] 30 mg PO BID@07,12 02/17/ 17 [Last Taken 03/30/17]


Divalproex Sodium [Depakote] 1,000 mg PO BID@1600,2000 02/17/17 [Last Taken 03/ 30/17]


Lisinopril [Zestril 40 mg (*)] 40 mg PO DAILY 02/17/17 [Last Taken 03/30/17]


Melatonin 10 mg PO HS 02/17/17 [Last Taken 03/23/17]


Ziprasidone HCl [Geodon] 80 mg PO 07,14,18,21 02/17/17 [Last Taken 03/30/17]


metFORMIN HCL [Glucophage 1000 mg] 1,000 mg PO BIDMEAL 02/17/17 [Last Taken 03/ 29/17]


HYDROcodone/APAP 10/325 [Norco 10/325 (*)] 1 tab PO Q4 PRN #90 tab 04/07/17 [

Last Taken Unknown]


Methocarbamol [Robaxin 750 mg (*)] 750 mg PO QID PRN #0 tab 04/07/17 [Last 

Taken Unknown]


Sennosides/Docusate Sodium [Senokot-S] 1 - 2 tab PO BID #0 tab 04/07/17 [Last 

Taken Unknown]








Discharge Medications: Refer to the Discharge Home Medication list for PRN 

reason.


PICC Care - Routine: N/A





- Orders


Services needed: Registered Nurse, Physical Therapy, Occupational Therapy


Diet Recommendation: no restrictions on diet


Diet Texture: Regular Texture Diet


Wound Care Instructions: Staples in abdominal wall should come out around April 13-15th, this can be done by Dr Alonso office, rehab, or neurosurgery office. If 

coming to Dr Alonso office please call [phone] to make appointment with [facility] 

or [facility]. Ok to shower over staples, no need to cover.





- Follow Up Care


Current Providers and Referrals: 


Kannan Car MD [Primary Care Provider] - 


Jia Gonzalez MD [Medical Doctor] - follow up in 2 weeks

## 2018-04-30 NOTE — PDRADPN
Radiology Procedure Note


Date of Procedure: 04/30/18


Radiologist: Onesimo Mariano


Anesthesiologist: Dr. Martins


Anesthesia: IV Sedation


Pre-op Diagnosis: post 5/1 discectomy.


Post-op Diagnosis: same


Indication: concern for infected 5/1 disc prosthesis


Procedure: ct guided diagnostic disc aspiration


Finding(s): right parapedicular approach to 5/1 disc space with coaxial 18/20 

chiba combination. Three 20G FNAs obtained.


Inf/Abcess present in the surg proc area at time of surgery?: No


EBL: Minimal


Complications: 





none


Specimen(s): 





Three 20G aspirates submitted in dry plastic specimen container.

## 2018-04-30 NOTE — PDANEPAE
ANE History of Present Illness





CT Guided Biopsy L5/S1 Disc





ANE Past Medical History





- Cardiovascular History


Hx Hypertension: Yes


Hx Arrhythmias: No


Hx Chest Pain: No


Hx Coronary Artery / Peripheral Vascular Disease: No


Hx CHF / Valvular Disease: No


Hx Palpitations: No





- Pulmonary History


Hx COPD: No


Hx Asthma/Reactive Airway Disease: No


Hx Recent Upper Respiratory Infection: No


Hx Oxygen in Use at Home: No


Hx Sleep Apnea: Yes


Sleep Apnea Screening Result - Last Documented: Positive


Pulmonary History Comment: Sleep apnea w/ CPAP;





- Neurologic History


Hx Cerebrovascular Accident: No


Hx Seizures: No


Hx Dementia: No





- Endocrine History


Hx Diabetes: Yes


Endocrine History Comment: DM II FOR THE PAST 5 OR 6 YRS AGO





- Renal History


Hx Renal Disorders: No


Renal History Comment: CHILDHOOD KIDNEY SURG





- Liver History


Hx Hepatic Disorders: No





- Neurological & Psychiatric Hx


Hx Neurological and Psychiatric Disorders: Yes


Neurological / Psychiatric History Comment: BIPOLAR W/PSYCHOTIC TENDENCY;





- Cancer History


Hx Cancer: No





- Congenital Disorder History


Hx Congenital Disorders: Yes


Congenital History Comment: KIDNEY CONGENITAL ABNORMALITY





- GI History


Hx Gastrointestinal Disorders: No


Gastrointestinal History Comment: Gas;





- Other Health History


Other Health History: NEG





- Chronic Pain History


Chronic Pain: Yes (BACK PAIN & SHOULDER L; b leg)





- Surgical History


Prior Surgeries: L4/5, L5/S1 FUSION W/ INSTRUMENTATION 10/2015.  ING HERNIA 

REPAIR L.  KIDNEY SURG CHILDHOOD;.  SHOULDER R REPLACEMENT.  VASECTOMY.  

COLONOSCOPIES X2.  MASSES REMOVED FROM CHEST AREA;





ANE Review of Systems


Review of Systems: 








- Exercise capacity


METS (RN): 3 METS





ANE Patient History





- Allergies


Allergies/Adverse Reactions: 








carbamazepine [From Tegretol] Allergy (Verified 09/17/15 13:24)


 Hives


ramelteon [From Rozerem] Allergy (Verified 09/17/15 13:24)


 Hives


vortioxetine hydrobromide [From Brintellix] Allergy (Verified 09/17/15 13:24)


 Hives


anticonvulsant Allergy (Uncoded 04/30/18 11:08)


 








- Home Medications


Home Medications: 








Ascorbic Acid [Vitamin C 500 mg (*)] 1,000 mg PO DAILY@0700 09/17/15 [Last 

Taken 03/23/17]


Benztropine Mesylate [Cogentin] 0.5 mg PO DAILY 09/17/15 [Last Taken 03/30/17]


Gabapentin [Neurontin 300 MG (*)] 600 mg PO TID@0700,1400,2100 09/17/15 [Last 

Taken 03/30/17]


Metoprolol Tartrate [Lopressor 100 mg (*)] 100 mg PO BID@07,18 09/17/15 [Last 

Taken 03/30/17]


Propylene Glycol [Systane Balance] 1 drop EACHEYE PRN PRN 09/17/15 [Last Taken 

03/23/17]


amLODIPine BESYLATE [Norvasc 10 mg (*)] 10 mg PO DAILY@07 09/17/15 [Last Taken 

03/30/17]


clonazePAM [Klonopin (*)] 0.5 mg PO TID@0700,1400,1800 09/17/15 [Last Taken 03/ 30/17]


Allopurinol [Allopurinol 300 MG (RX)] 300 mg PO BID@07,18 02/17/17 [Last Taken 

03/30/17]


Amantadine HCl [Symmetrel] 30 mg PO BID 02/17/17 [Last Taken 03/30/17]


Atorvastatin Calcium [Lipitor 20 mg (*)] 20 mg PO HS 02/17/17 [Last Taken 03/30/ 17]


Colchicine [Colchicine (*)] 1.8 mg PO DAILY PRN 02/17/17 [Last Taken 03/30/17]


Dextroamphetamine/Amphetamine [Adderall 30 mg Tablet] 30 mg PO BID@07,12 02/17/ 17 [Last Taken 03/30/17]


Divalproex Sodium [Depakote] 1,000 mg PO BID@1600,2000 02/17/17 [Last Taken 03/ 30/17]


Lisinopril [Zestril 40 mg (*)] 40 mg PO DAILY 02/17/17 [Last Taken 03/30/17]


Melatonin 5 mg PO HS 02/17/17 [Last Taken 03/23/17]


Ziprasidone HCl [Geodon] 80 mg PO BID 02/17/17 [Last Taken 03/30/17]


metFORMIN HCL [Glucophage 1000 mg] 1,000 mg PO BIDMEAL 02/17/17 [Last Taken 03/ 29/17]


Diclofenac Potassium 50 mg PO BID 04/27/18 [Last Taken Unknown]


Doxazosin Mesylate [Cardura 4 MG (*)] 8 mg PO DAILY 04/27/18 [Last Taken Unknown

]


Fish Oil 1000 mg (*) 1,600 mg PO BID 04/27/18 [Last Taken Unknown]


Furosemide 40 mg PO DAILY 04/27/18 [Last Taken Unknown]


Glucosamine-Chondroitin Tablet 1,500 mg PO BID 04/27/18 [Last Taken Unknown]


Ipratropium 0.06% Nasal 1 spray TID 04/27/18 [Last Taken Unknown]


Methocarbamol [Robaxin 750 mg (*)] 750 mg PO BID PRN 04/27/18 [Last Taken 

Unknown]


Multivitamins 1 tab PO DAILY 04/27/18 [Last Taken Unknown]


Preservision Areds 2 Softgel PO BID 04/27/18 [Last Taken Unknown]


Tylenol  mg (*) 1,000 mg PO BID 04/27/18 [Last Taken Unknown]


Vitamin D3 2,000 iunits PO DAILY 04/27/18 [Last Taken Unknown]


Vraylar 4.5 mg PO DAILY 04/27/18 [Last Taken Unknown]








- Smoking Hx


Smoking Status: Former smoker





- Family Anes Hx


Family Hx Anesthesia Complications: NEG





ANE Labs/Vital Signs





- Labs


Result Diagrams: 


 04/30/18 10:45








- Vital Signs


Blood Pressure: 158/95


Heart Rate: 72


Respiratory Rate: 18


O2 Sat (%): 94


Height: 182.88 cm


Weight: 141.067 kg





ANE Physical Exam





- Airway


Neck exam: FROM


Mallampati Score: Class 3


Mouth exam: normal dental/mouth exam





- Pulmonary


Pulmonary: clear to auscultation





- Cardiovascular


Cardiovascular: regular rate and rhythym





- ASA Status


ASA Status: III





ANE Anesthesia Plan


Anesthesia Plan: GA with mask

## 2018-04-30 NOTE — PDGENHP
History & Physical


Chief Complaint: suspected L5/S1 hardware infection


History of Present Illness: post revision fusion. CT findings worrisome for 

infection at 5/1 disc space


Relevant Physical Exam: nad


Cardiorespiratory Assessment: rrr, nl wob

## 2018-04-30 NOTE — PDPROPOC
Sedation Plan of Care


Sedation Plan of Care: mental status noted, patient educated of risks, benefits

, alternatives, patient can tolerate sedation


ASA Classification: ASA 3


Planned drugs: other (per anaesthesia)


Mallampati Score: Class 3


Mallampati Reference Image:

## 2018-06-22 NOTE — POSTOPPROG
Post Op Note


Date of Operation: 06/22/18


Surgeon: Ania Faria


Assistant: SHANE Faria PAC


Anesthesia: GET(General Endotracheal)


Pre-op Diagnosis: lumbar pseudoarthoresis, stenosis


Post-op Diagnosis: lumbar pseudoarthoresis, stenosis


Indication: lumbar pseudoarthoresis, stenosis


Procedure: Stage 1 Redo L5/S1 ALIF


Inf/Abcess present in the surg proc area at time of surgery?: No


EBL: Greater than 1000


Drains: Shaquille MCKINNON Addendum





- Addendum


.: 





S: resting comfortably





O: NAD A&Ox3


MAEx4 5/5 and equal in BUE and BLE





A/P 54y/o male s/p Stage I: Redo L5/S1 ALIF with partial L5 corepectomy; Stage 

2 postponed due to EBL 





-TO ICU, likely to return to OR for Stage II this weekend


-Optimize pain management


-Restart patient psych meds


-Diet per gen surgery


-DVT prophx: TEDs, SCDs, 


-PT/OT


-Please notify NS with any change in neuro/motor exam

## 2018-06-22 NOTE — GOP
[f 
rep st]



                                                                OPERATIVE REPORT





DATE OF OPERATION:  06/22/2018



SURGEON:  Jai Gonzalez MD



CO-SURGEON:  Dr. Blanco Alonso.



ASSISTANT:   Ania Faria P.A.-C.



COMPLICATIONS:  None.



ANESTHESIA:  General.



PREOPERATIVE DIAGNOSIS:  

1.  L5-S1 pseudoarthrosis with broken hardware at bilateral S1 and loose 
hardware at L5 and S1.

2.  History of prior spinal fusion x2 with posterior fusion and anterior lumbar 
fusion L5-S1.

3.  Progressive low back pain.

4.  Progressive lower extremity radiculopathy.

5.  Treatment refractory to nonoperative intervention.



POSTOPERATIVE DIAGNOSIS:  

1.  L5-S1 pseudoarthrosis with broken hardware at bilateral S1 and loose 
hardware at L5 and S1.

2.  History of prior spinal fusion x2 with posterior fusion and anterior lumbar 
fusion L5-S1.

3.  Progressive low back pain.

4.  Progressive lower extremity radiculopathy.

5.  Treatment refractory to nonoperative intervention.



PROCEDURE PERFORMED:  

1.  Anterior arthrodesis with approach to L5-S1.

2.  Exploration of prior L5-S1 hardware with subsequent removal of L5-S1 
interbody cage.

3.  Partial L5 and partial S1 corpectomies and redo-interbody fusion using a 28 
x 26 x 24 mm Verte-Stack PEEK cage filled with morselized autograft and 
allograft.

4.  Anterior lumbar fusion L5-S1 with a 45 mm Medtronic 4 hole plate.

5.  Use of intraoperative fluoroscopy, less than 1 hour physician time.

6.  Use or neuromonitoring.



FINDINGS:  per imaging



SPECIMENS:  The interbody cage and specimens were taken from the L5-S1 disk 
space and vertebral bodies for microbiology culture and for gross specimen 
analysis.



ESTIMATED BLOOD LOSS:  2.5 L.



INDICATIONS:  The patient is a 53-year-old gentleman who has undergone prior 
multiple lumbar surgeries including L5-S1 anterior posterior revision for 
pseudoarthrosis by myself approximately 2 years ago.  The patient presented 
with worsening low back pain.  Had evidence of broken hardware on his imaging 
studies.  Imaging demonstrated S1 broken screws with pseudoarthrosis at the L5-
S1 level.  After discussion of the risks, benefits, and treatment alternatives, 
we decided to proceed forth with surgery as described above.



DESCRIPTION OF PROCEDURE:  Patient was brought to the operating theater and 
underwent general endotracheal anesthesia without complication.  He had 
Venodynes, SHERI hose and appropriate lines placed by Anesthesia.  He was 
maintained supine on the operating table with a small bump placed on the small 
of his back and his arms extended to the sides.  The lower abdominal abdomen 
was then marked, prepped and draped in the usual sterile surgical fashion.  A 
time-out was completed per protocol and the patient received antibiotics within 
1 hour of incision. 



Dr. Alonso and his team will then dictate a separate operative report the 
anterior exposure to the L5-S1 level.  This was noted to be quite tedious 
secondary to the fact the patient's tissues were all noted to be quite adhesed 
and scarred in place.  



Once he was able to expose the prior hardware and the L5 and S1 vertebral bodies
, we then removed the anterior Pivox plate and passed it off the field.  We 
then used a combination of the bur tip on the drill bit, curettes, Kerrison 
punches to complete a redo L5-S1 diskectomy.  The patient had some erosion of 
the L5 and S1 vertebral bodies and I therefore used the curettes and bur tip on 
the drill bit to complete a partial L5 and a partial S1 corpectomy until we got 
good flat surfaces on the bone.  We prepared the bone surfaces and measured the 
interbody space.  We placed a 20 x 26 x 24 mm PEEK cage filled with morselized 
autograft and allograft between the L5-S1 levels.  AP and lateral x-rays 
demonstrated good placement of the interbody hardware.  The patient's left 
iliac vessel was noted to be non-mobile and crossing over the L5 vertebral body 
and across the L5-S1 disc space.  I was therefore limited in the kind of 
anterior plate I could place across the L5-S1 space that woud also be long 
enough to span the defect.  I ended up having to use a 45 mm 4 hole plate and 
because I could not place the plate over the midline, I had to place it 
eccentrically at an angle with 1 screw placed at L5 and 1 screw placed at S1.  
I could not place screws deep enough for the anti-backout device to be rotated 
over the L5 screw and therefore had to leave it slightly proud.  I also tried 
to back it out to place a smaller screw but it would not move and I was worried 
about stripping the scrw head.  AP and lateral x-rays demonstrated good 
placement of the hardware. 



 At this point, Dr. Alonso and his team will dictate in a separate operative 
report of the abdominal closure.  By this point, the patient lost approximately 
2.5 L of blood and given that we had to still complete stage 2 posterior 
revision of the patient's hardware I opted at this point, to stop and bring him 
back for stage 2 at a separate day once we knew that he was doing well from a 
neurologic and lab standpoint. 



Please note, that this surgery is greater than 50% more difficult than the 
average surgery secondary to the fact that this was a revision and we had to go 
through scar tissue and we had poor access secondary to the scared down nature 
of the left iliac vein which is in our surgical field.









Job #:  [id number]

MTDD

## 2018-06-22 NOTE — PDHPUP
History & Physical Update


H&P update statement: 


This history and physical update is based on an assessment of the patient which 

was completed after admission or registration (within 24 hours), but prior to 

the surgery/procedure.





H&P update: H&P reviewed & patient examined, no change in patient's condition 

since H&P completed (All questions answered.  Consent signed and site marked. )

## 2018-06-22 NOTE — PDMN
Medical Necessity


Medical necessity: [facility] IP only surgery; cpt 04988 Musculoskeletal Surgery IP 

59417 Removal of Instrumentation IP (Redo L5/S1 ALIF; L2/3 TLIF, L2-S2 PSF)

## 2018-06-22 NOTE — PDANEPAE
ANE History of Present Illness


53 year old male with plans for large anterior and posterior spine surgery.





ANE Past Medical History





- Cardiovascular History


Hx Hypertension: Yes


Hx Arrhythmias: No


Hx Chest Pain: No


Hx Coronary Artery / Peripheral Vascular Disease: No


Hx CHF / Valvular Disease: No


Hx Palpitations: No





- Pulmonary History


Hx COPD: No


Hx Asthma/Reactive Airway Disease: No


Hx Recent Upper Respiratory Infection: No


Hx Oxygen in Use at Home: No


Hx Sleep Apnea: Yes


Sleep Apnea Screening Result - Last Documented: Positive


Pulmonary History Comment: Sleep apnea TOLERATES  CPAP;





- Neurologic History


Hx Cerebrovascular Accident: No


Hx Seizures: No


Hx Dementia: No


Neurologic History Comment: NO HX OF SEZURES





- Endocrine History


Hx Diabetes: Yes


Hypothyroid: No


Hyperthyroid: No


Obesity: moderate


Endocrine History Comment: DM II -DX ~2010. HGB A 1 C ~ 7.6.





- Renal History


Hx Renal Disorders: Yes


Renal History Comment: PROBLEM EMPTYING BLADDER





- Liver History


Hx Hepatic Disorders: No





- Neurological & Psychiatric Hx


Hx Neurological and Psychiatric Disorders: Yes


Neurological / Psychiatric History Comment: BACK PAIN- PAIN RADIATES DOWN BILAT 

LEGS;.  BIPOLAR W/PSYCHOTIC TENDENCY;STABLE W/RX;





- Cancer History


Hx Cancer: No





- Congenital Disorder History


Hx Congenital Disorders: Yes


Congenital History Comment: KIDNEY CONGENITAL ABNORMALITY





- GI History


Hx Gastrointestinal Disorders: No


Gastrointestinal History Comment: Gas;





- Other Health History


Other Health History: 1 MISSING TOOTH-LOWER MOLAR.





- Chronic Pain History


Chronic Pain: Yes (BACK PAIN & SHOULDER L; b leg)





- Surgical History


Prior Surgeries: L5/S1 BX/NEG FOR INFECTION 4-18;.  L4/5, L5/S1 FUSION W/ 

INSTRUMENTATION 10/2015.  ING HERNIA REPAIR L.  KIDNEY SURG CHILDHOOD;.  

SHOULDER R REPLACEMENT.  VASECTOMY.  COLONOSCOPIES X2.  MASSES REMOVED FROM 

CHEST AREA;





ANE Review of Systems


Review of Systems: 








- Exercise capacity


Exercise capacity: <4 METS


METS (RN): 3 METS





- Systems


Muscolosketal: Reports: back pain


Neurological: Reports: numbness, tingling (Bilateral legs)





ANE Patient History





- Allergies


Allergies/Adverse Reactions: 








carbamazepine [From Tegretol] Allergy (Verified 06/04/18 16:55)


 Hives


ramelteon [From Rozerem] Allergy (Verified 06/04/18 16:55)


 Hives


vortioxetine hydrobromide [From Brintellix] Allergy (Verified 06/04/18 16:55)


 Hives


anticonvulsant Allergy (Uncoded 06/04/18 16:55)


 Hives








- Home Medications


Home medications: home medication list seen and reviewed


Home Medications: 








RX: Ascorbic Acid [Vitamin C 500 mg (*)] 1,000 mg PO DAILY@0700 09/17/15 [Last 

Taken 06/08/18]


RX: Benztropine Mesylate [Cogentin] 0.5 mg PO DAILY 09/17/15 [Last Taken 06/22/ 18]


RX: Gabapentin [Neurontin 300 MG (*)] 600 mg PO TID@0700,1400,2100 09/17/15 [

Last Taken 06/22/18]


RX: Metoprolol Tartrate [Lopressor 100 mg (*)] 100 mg PO BID@07,18 09/17/15 [

Last Taken 06/21/18]


RX: amLODIPine BESYLATE [Norvasc 10 mg (*)] 10 mg PO DAILY@07 09/17/15 [Last 

Taken 06/22/18]


RX: clonazePAM [Klonopin (*)] 0.5 mg PO TID@0700,1400,1800 09/17/15 [Last Taken 

06/22/18]


RX: Allopurinol [Allopurinol 300 MG (RX)] 300 mg PO BID@07,18 02/17/17 [Last 

Taken 06/22/18]


RX: Amantadine HCl [Symmetrel] 100 mg PO BID 02/17/17 [Last Taken 06/21/18]


RX: Atorvastatin Calcium [Lipitor 20 mg (*)] 20 mg PO HS 02/17/17 [Last Taken 06 /21/18]


RX: Colchicine [Colchicine (*)] 1.8 mg PO DAILY PRN 02/17/17 [Last Taken 05/23/ 18]


RX: Dextroamphetamine/Amphetamine [Adderall 30 mg Tablet] 30 mg PO BID@07,12 02/ 17/17 [Last Taken 06/21/18]


RX: Divalproex Sodium [Depakote] 1,000 mg PO DAILY@16 02/17/17 [Last Taken 06/21 /18]


RX: Lisinopril [Zestril 40 mg (*)] 40 mg PO DAILY 02/17/17 [Last Taken 06/21/18]


RX: Melatonin 5 mg PO HS 02/17/17 [Last Taken 06/08/18]


RX: Ziprasidone HCl [Geodon] 80 mg PO TID 02/17/17 [Last Taken 06/22/18]


RX: metFORMIN HCL [Glucophage 1000 mg] 1,000 mg PO BIDMEAL 02/17/17 [Last Taken 

06/20/18]


Glucosamine-Chondroitin Tablet 1,500 mg PO BID 04/27/18 [Last Taken 06/08/18]


Ipratropium 0.06% Nasal 1 spray EACHNARE TID 04/27/18 [Last Taken 06/22/18]


RX: Doxazosin Mesylate [Cardura 4 MG (*)] 8 mg PO DAILY 04/27/18 [Last Taken 06/ 22/18]


Vraylar 4.5 mg PO DAILY@21 04/27/18 [Last Taken 06/21/18]


Acetaminophen [Tylenol  mg (*)] 1,000 mg PO BID@07,15 06/04/18 [Last 

Taken 06/22/18]


C/E/Zn/Cu/OM3/DHA/EPA/LUT/ZEAX [Preservision Areds 2 Softgel] 1 each PO BID 06/ 04/18 [Last Taken 06/08/18]


Cholecalciferol Vit D3 [Vitamin D3 (*)] 2,000 units PO DAILY 06/04/18 [Last 

Taken 06/08/18]


Divalproex [Depakote] 1,500 mg PO DAILY@08 06/04/18 [Last Taken 06/22/18]


Furosemide [Lasix 40 MG (*)] 40 mg PO DAILY PRN 06/04/18 [Last Taken 06/08/18]


Levothyroxine [Synthroid 50 mcg (*)] 50 mcg PO DAILY06 06/04/18 [Last Taken 06/ 22/18]


Multivitamins [Multivitamin (*)] 1 each PO DAILY 06/04/18 [Last Taken 06/08/18]


Omega-3 Fatty Acids [Fish Oil 1000 mg (*)] 1,000 mg PO BID 06/04/18 [Last Taken 

06/08/18]


Zolpidem Tartrate [Ambien] 10 mg PO HS 06/04/18 [Last Taken 06/21/18]


tiZANidine HCL [Zanaflex] 4 mg PO BID@07,15 06/04/18 [Last Taken 06/22/18]








- NPO status


NPO Status: no food or drink >8 hours


NPO Since - Liquids (Date): 06/21/18


NPO Since - Liquids (Time): 23:00


NPO Since - Solids (Date): 06/21/18


NPO Since - Solids (Time): 23:00





- Anes Hx


Anes Hx: no prior problems





- Smoking Hx


Smoking Status: Former smoker (Former smokeless tobacco)


Marijuana use: No





- Alcohol Use


Alcohol Use: None





- Family Anes Hx


Family Anes Hx: neg - N/A


Family Hx Anesthesia Complications: NEG





ANE Labs/Vital Signs





- Vital Signs


Vital Signs: reviewed preoperatively; see RN documention for details


Blood Pressure: 112/73


Heart Rate: 95


Respiratory Rate: 16


O2 Sat (%): 92


Height: 182.88 cm


Weight: 140.614 kg





ANE Physical Exam





- Airway


Neck exam: increased neck circumference, short neck


Mallampati Score: Class 3


Mouth exam: normal dental/mouth exam, small mouth opening





- Pulmonary


Pulmonary: no respiratory distress





- Cardiovascular


Cardiovascular: regular rate and rhythym





- ASA Status


ASA Status: III





ANE Anesthesia Plan


Anesthesia Plan: general endotracheal anesthesia


Lines/Monitors: arterial line


Total IV Anesthesia: No

## 2018-06-23 NOTE — GCON
[f rep st]



                                                                    CONSULTATION





PULMONARY/CRITICAL CARE CONSULTATION



DATE OF CONSULTATION:  06/22/2018



REFERRING PHYSICIAN:  Jai Gonzalez MD



REASON FOR REFERRAL:  Evaluation and management of hypotension and acute blood loss.



HISTORY:  The patient is a 53-year-old male with a long history of back pain, status post several henry
geries.  Because of ongoing pain not responsive to nonoperative treatment, he was admitted for lumbar
 surgery with planned anterior and posterior approaches.  During the anterior approach through the ab
domen, they encountered quite a bit of scarring/adhesions, and had an estimated blood loss of 2.5 L. 
 Because of the large amount of blood loss, the anterior approach was completed and it was decided to
 delay the posterior approach until at least tomorrow.  The patient is returned to the intensive care
 unit, extubated.  He is groggy but denies pain currently.



PAST MEDICAL HISTORY:  

1.  Obstructive sleep apnea.  The patient uses CPAP and oxygen with sleep, and is apparently complian
t with his current therapy, which is effective per recent CPAP downloads.

2.  History of bipolar disorder. 

3.  Type 2 diabetes. 

4.  Hypertension. 

5.  Hyperlipidemia.



MEDICATIONS:  At the time of admission include allopurinol, amlodipine, atorvastatin, doxazosin, devyn
pentin, Glucophage, ipratropium, lisinopril, metoprolol, ProAir, tizanidine, Adderall, amantadine, cl
onazepam, Depakote, melatonin, ziprasidone, Vraylar, and zolpidem.



ALLERGIES:  Carbamazepine, Rozerem, and Tegretol.



SOCIAL HISTORY:  The patient does not drink.  He is a former smoker.  He denies illicit substance use
.



FAMILY HISTORY:  Unremarkable.



REVIEW OF SYSTEMS:  Unobtainable due to the patient's sedation/postoperative state.



PHYSICAL EXAMINATION:  VITAL SIGNS:  Blood pressure is 89/68 with heart rate of 113.  His oxygen satu
rations are 93% on CPAP with supplemental oxygen at 5 L/minute.  He is afebrile.  HEENT:  Normocephal
ic and atraumatic.  No icterus.  NECK:  No JVD.  Trachea is midline.  CHEST:  Clear to auscultation. 
 CARDIAC:  Regular rate and rhythm without murmur.  ABDOMEN:  Soft and nontender.  He is obese.  Terry
l sounds are absent.  EXTREMITIES:  No clubbing, cyanosis, or edema.  NEURO:  The patient is sedated 
and somnolent but does arouse weakly and follows simple commands.  There are no gross motor or sensor
y deficits.



LABORATORY:  A hemoglobin is 15.5.  A white blood count is 16.1, a platelet count is 175.  Chemistry 
group shows a creatinine of 1.1.  His glucose is 204.  An INR is 1.1.



ASSESSMENT:  

1.  Hypotension.  The patient had a brief episode of hypotension, likely related to large volume loss
 as well as vasoplegia due to the patient's anesthesia.  He is currently receiving IV fluids and I ex
pect this will respond to fluids.

2.  Acute blood loss.  The patient had 2.5 L of estimated blood loss.  Despite this, his hemoglobin i
s normal.  Likely, he still has some intravascular volume loss and will dilute with further IV fluids
 for volume expansion to counteract the patient's hypotension.  There is no evidence of ongoing acute
 blood loss or coagulopathy.

3.  Obstructive sleep apnea.  The patient is managed as an outpatient with continuous positive-airway
 pressure and oxygen, and this appears to be effective and he is compliant.  He is currently using co
ntinuous positive-airway pressure and oxygen.  His oxygen saturations are adequate.



RECOMMENDATIONS:  

1.  Continue CPAP and oxygen. 

2.  IV fluid boluses. 

3.  Follow hemoglobin. 

4.  If the patient does well, he can probably proceed with surgery tomorrow.





Job #:  [id number]

## 2018-06-23 NOTE — PDINTPN
Intensivist Progress Note


Assessment/Plan: 


Assessment:


Acute blood loss anemia: Had EBL 2.5 liters 6/22. Initial post-op Hgb normal, 

now this has fallen as expected. No signs of significant ongoing blood loss/

coagulopathy. 


BRIAN: Likely due to pre-renal/hypovolemia after lager acute blood loss 6/22. 

Urine output OK. 


BRYANT: On CPAP/oxygen, with good saturations.





Plan: Follow H/H, Cr. IVF for BRIAN. Continue CPAP/oxygen PRN sleep.





06/23/18 16:10





Subjective: 





Feels OK pain controlled s/p posterior L-spine surgery.


Objective: 





 Vital Signs











Temp Pulse Resp BP Pulse Ox


 


 36.9 C   91   10 L  96/46 L  94 


 


 06/23/18 14:50  06/23/18 14:50  06/23/18 14:50  06/23/18 14:50  06/23/18 14:50








 Microbiology











 06/22/18 10:53 Gram Stain - Final





 Back - Other 


 


 06/22/18 10:16 Gram Stain - Final





 Other - Other 


 


 06/22/18 10:53 Mycobacterial Smear (NATHAN) - Final





 Back - Other 








 Laboratory Results





 06/23/18 15:15 





 











 06/22/18 06/23/18 06/24/18





 05:59 05:59 05:59


 


Intake Total  5845 


 


Output Total  3110 295


 


Balance  2735 -295








 











PT  13.8 SEC (12.0-15.0)   06/22/18  16:35    


 


INR  1.04  (0.83-1.16)   06/22/18  16:35    














Physical Exam





- Physical Exam


General Appearance: alert, no apparent distress


EENT: normal ENT inspection


Neck: normal inspection


Respiratory: lungs clear, normal breath sounds


Cardiac/Chest: regular rate, rhythm, No edema


Abdomen: normal bowel sounds, non-tender


Skin: normal color


Extremities: normal inspection


Neuro/Psych: alert, No normal mood/affect (sedated but arousable), No motor 

weakness





ICD10 Worksheet


Patient Problems: 


 Problems











Problem Status Onset


 


BRIAN (acute kidney injury) Acute  


 


Arthrodesis status Acute  


 


Diabetes Acute  


 


HTN (hypertension) Acute  


 


Lumbago due to displacement of intervertebral disc Acute  


 


Lumbar stenosis Acute  


 


Pain Acute

## 2018-06-23 NOTE — POSTANESTH
Post Anesthetic Evaluation


Cardiovascular Status: Normal, Stable, Similar to Pre-Op Cond


Respiratory Status: Similar to Pre-op Cond., Tx Decrease in SpO2 (Significant 

sleep apnea, required nasal pillow CPAP in PACU following surgery on 6-22-18)


Level of Consciousness/Mental Status: Can Participate in Eval, Mildly Sleepy, 

Arousable


Pain Control: Adequate, Prn Tx Ordered


Nausea/Vomiting Control: Adequate, Prn Tx Ordered


Complications Possibly Related to Anesthesia: None Noted

## 2018-06-23 NOTE — NEUSURGPN
Assessment/Plan: 





POD # 1 s/p Stage I of II L5/S1 revision ALIF


1- plan for stage II posterior hardware revision/removal, PSF L2-S2 with iliac 

bolts this morning


- all questions answered and consents re-signed and site marked.


- 2 g Ancef OCTOR





Subjective: 


right posterior thigh pain to the level of the knee; abdominal pain; left hand 

numbness but no other new complaints.


Objective: 


A & o X 3


speech fluent


Full strength in B UE/LE throughout


Diminished sensation to left hand


Abdominal dressing C/D/I


Urinary Catheter in Place: Yes


Urinary Catheter Indication: Other (Use Comment) (back to OR this morning for 

Stage II surgery)


Catheter Insertion Date: 06/22/18





- Physician


Patient Seen by : Carlos





Neurosurgery Physical Exam





- Vitals, I&O, Labs





 I and O











 06/22/18 06/23/18 06/24/18





 05:59 05:59 05:59


 


Intake Total  5845 


 


Output Total  3110 


 


Balance  2735 


 


Weight  140.614 kg 


 


Intake:   


 


  Oral (ml)  120 


 


  IV Intake (ml)  4800 


 


  IV Infused (ml)  925 


 


    NS W/ 20 KCl/L 1,000 ml @  925 





    75 mls/hr IV CONT NUBIA Rx   





    #:[id number]   


 


Output:   


 


  Urine (ml)  1110 


 


    Catheter  1110 


 


  Estimated Blood Loss (ml)  2000 


 


Other:   


 


  Number of Stools   


 


    Catheter  0 








 Microbiology











 06/22/18 10:53 Mycobacterial Smear (NATHAN) - Final





 Back - Other 


 


 06/22/18 10:53 Gram Stain - Final





 Back - Other 


 


 06/22/18 10:16 Gram Stain - Final





 Other - Other 








 Vital Signs











Temp Pulse Resp BP Pulse Ox


 


 36.8 C   65   14   99/50 L  98 


 


 06/23/18 07:13  06/23/18 07:13  06/23/18 07:13  06/23/18 07:13  06/23/18 07:13








 Laboratory Results





 06/22/18 16:35 





 06/22/18 13:00 











ICD10 Worksheet


Patient Problems: 


 Problems











Problem Status Onset


 


BRIAN (acute kidney injury) Acute  


 


Arthrodesis status Acute  


 


Diabetes Acute  


 


HTN (hypertension) Acute  


 


Lumbago due to displacement of intervertebral disc Acute  


 


Lumbar stenosis Acute  


 


Pain Acute

## 2018-06-23 NOTE — SOAPPROG
SOAP Progress Note


Assessment/Plan: 


Assessment:








54yo M s/p abd exposure for re-do and spinal fusion


- overnight did well. Pain controlled


- Has been NPO, will go to the OR today for posterior fusion


- per RN report, abdomen is soft and dressings are clean


- discussed diet after surgery with RN and Carlos.

















Plan:





06/23/18 09:00





Subjective: 





NA


Objective: 





 Vital Signs











Temp Pulse Resp BP Pulse Ox


 


 36.8 C   66   14   99/60 L  96 


 


 06/23/18 07:30  06/23/18 07:30  06/23/18 07:30  06/23/18 07:30  06/23/18 07:30








 Microbiology











 06/22/18 10:53 Mycobacterial Smear (NATHAN) - Final





 Back - Other 


 


 06/22/18 10:53 Gram Stain - Final





 Back - Other 


 


 06/22/18 10:16 Gram Stain - Final





 Other - Other 








 Laboratory Results





 06/23/18 07:45 





 06/23/18 07:45 





 











 06/22/18 06/23/18 06/24/18





 05:59 05:59 05:59


 


Intake Total  5845 


 


Output Total  3110 150


 


Balance  2735 -150








 











PT  13.8 SEC (12.0-15.0)   06/22/18  16:35    


 


INR  1.04  (0.83-1.16)   06/22/18  16:35    














ICD10 Worksheet


Patient Problems: 


 Problems











Problem Status Onset


 


BRIAN (acute kidney injury) Acute  


 


Arthrodesis status Acute  


 


Diabetes Acute  


 


HTN (hypertension) Acute  


 


Lumbago due to displacement of intervertebral disc Acute  


 


Lumbar stenosis Acute  


 


Pain Acute

## 2018-06-23 NOTE — SOAPPROG
SOAP Progress Note


Assessment/Plan: 


POST OP CHECK:


Assessment:





Doing well s/p L2-S2 fusion with l2/3 tlif








Plan:


cpm IN icu


precedex drip for pain control


BRANDON x 2 to suction





06/23/18 14:47





06/23/18 14:51





Subjective: 





awake, slert comfortable


speaking and cooperative, states his left hand still feels numb


Objective: 





 Vital Signs











Temp Pulse Resp BP Pulse Ox


 


 36.8 C   66   14   99/60 L  96 


 


 06/23/18 07:30  06/23/18 09:20  06/23/18 09:20  06/23/18 09:20  06/23/18 09:20








 Microbiology











 06/22/18 10:53 Gram Stain - Final





 Back - Other 


 


 06/22/18 10:16 Gram Stain - Final





 Other - Other 


 


 06/22/18 10:53 Mycobacterial Smear (NATHAN) - Final





 Back - Other 








 Laboratory Results





 06/23/18 07:45 





 06/23/18 07:45 





 











 06/22/18 06/23/18 06/24/18





 05:59 05:59 05:59


 


Intake Total  5845 


 


Output Total  3110 150


 


Balance  2735 -150








 











PT  13.8 SEC (12.0-15.0)   06/22/18  16:35    


 


INR  1.04  (0.83-1.16)   06/22/18  16:35    








Neuro:


Follows commands x 4


MALAVE, sens +LT, tingling in left hand


Eyes open, speech clear 


PERRLA





ICD10 Worksheet


Patient Problems: 


 Problems











Problem Status Onset


 


BRIAN (acute kidney injury) Acute  


 


Arthrodesis status Acute  


 


Diabetes Acute  


 


HTN (hypertension) Acute  


 


Lumbago due to displacement of intervertebral disc Acute  


 


Lumbar stenosis Acute  


 


Pain Acute

## 2018-06-23 NOTE — GOP
[f rep st]



                                                                OPERATIVE REPORT





DATE OF OPERATION:  06/22/2018



SURGEON:  Blanco Alonso MD



ASSISTANT:  Jay Jay Connor MD. __________



PREOPERATIVE DIAGNOSIS:  Spinal instability.



POSTOPERATIVE DIAGNOSIS:  Spinal instability.



PROCEDURE PERFORMED:  Anterior spine exposure for the L5-S1 area.



FINDINGS:  





INDICATIONS:  The patient has had previous spine surgery which has failed and he is here for repeat s
pine fusion at the L5-S1 level.



DESCRIPTION OF PROCEDURE:  The patient was taken to the operating room where he received satisfactory
 general endotracheal anesthesia by Dr. Carbajal.  Placed in the supine position, prepped and draped in
 the usual sterile fashion.  A vertical lower abdominal incision was made from the umbilicus to the p
ubis.  Dissection carried through the linea alba, and the abdomen was carefully opened.  Adhesions we
re taken down.  The omentum and small bowel were freed up from the pelvis and packed away in the uppe
r abdomen.  The retroperitoneum was entered over the L5-S1 area in the midline.  Dissection extended 
down to the L5-S1 plate.  It was quite difficult and tedious with marked inflammatory reaction and ve
ry thick fibrous tissue.  The iliac vein was carefully mobilized, retracted as much as possible.  Thi
s did result in some bleeding from some branches and some direct bleeding from the iliac vein which h
ad to be suture controlled with 5-0 Prolene interrupted sutures at times.  The scar tissue was dissec
jayda free of the L5 body and the S1 body, and the previously placed anterior plate was fully exposed. 
 Exposure was maintained with the Omni retractor.  At that point hemostasis was stable.  The case was
 turned over to Dr. Gonzalez for removal of the plate and anterior spinal fusion.  When that was comple
jayda, I returned.  The retroperitoneum was closed with a running 0 Vicryl suture.  The distal contents
 were replaced in a normal position.  Hemostasis was assured.  The abdomen was closed with a running 
#1 PDS for the linea alba and skin staples for the skin.  The wound was infiltrated with 0.5% Marcain
e.  Blood loss from exposure before the procedure was approximately 400 cc.  There were no complicati
ons.





Job #:  [id number]

## 2018-06-23 NOTE — ASMTCMCOM
CM Note

 

CM Note                       

Notes:

Pt admitted w/progressive lower back pain and LE radiculopathy. He is post-op day 1, had fusion and 


T-Lif w/ some hardware removal. Pt has not yet worked w/therapies, dc needs tbd. 

 

Date Signed:  06/23/2018 05:51 PM

Electronically Signed By:Malorie Bardales RN

## 2018-06-23 NOTE — POSTOPPROG
Post Op Note


Date of Operation: 06/23/18


Surgeon: Jai Gonzalez


Assistant: Brennon Rosen PAC


Anesthesiologist: Evangelist Castro


Anesthesia: GET(General Endotracheal)


Pre-op Diagnosis: Lumbar DJD, pseudarthrosis, broken hardware, spinal stenosis


Post-op Diagnosis: same


Indication: Back/leg pain


Procedure: L2-S2 fusion with L2/3 TLIF removal of hardware


Findings: broken hardware, DJD, spinal stenosis


Inf/Abcess present in the surg proc area at time of surgery?: No


EBL: 500-1000


Complications: 





None


Drains: Shaquille Harry (BRANDON x 2 to bulb suction)


Specimen(s): 





None

## 2018-06-23 NOTE — GOP
[f 
rep st]



                                                                OPERATIVE REPORT





DATE OF OPERATION:  06/23/2018



SURGEON:  Jai Gonzalez MD



FIRST ASSISTANT:  Brennon Rosen PA-C.



ANESTHESIA:  General.



PREOPERATIVE DIAGNOSIS:  

1.  Pseudoarthrosis L5-S1 with history of prior fusion L3 through S1.  

2.  Low back pain. 

3.  Radiculopathy.  

4.  Adjacent level breakdown at L2-L3 with severe spinal stenosis. 

5.  Treatment refractory to nonoperative intervention.  



POSTOPERATIVE DIAGNOSIS:  

1.  Pseudoarthrosis L5-S1 with history of prior fusion L3 through S1.  

2.  Low back pain. 

3.  Radiculopathy.  

4.  Adjacent level breakdown at L2-L3 with severe spinal stenosis. 

5.  Treatment refractory to nonoperative intervention.  



PROCEDURE PERFORMED:  

This is stage 2 of a planned 2-stage surgical procedure.  Stage 1 was a redo-
anterior fusion completed on the 22nd of June 2018.  Stage 2 was originally 
planned to follow stage 1 immediately, however, given the length and 
complicated surgical intervention required for stage 1, we decided to wait 
until the following day for stage 2, which is being completed today.

1.  Posterior arthrodesis with approach to L2, L3, L4, L5, S1, and S2.

2.  Exploration of prior lumbar hardware L3 through S1.

3.  Removal of posterior hardware including bilateral S1 screws.

4.  Posterolateral fusion with morselized autograft and allograft bilaterally 
between L2 and S2.

5.  Posterolateral fusion with placement of new bilateral pedicle screws in L2, 
S2 and iliac bolts from the [employer] Solera 4.75 system.

6.  Augmentation of left S1 screw with bone cement.

7.  Decompressive laminectomy with bilateral medial facetectomies, L2-L3.

8.  L2-L3 transforaminal lumbar interbody fusion with an 8 x 28 mm titanium 
PEEK elevate cage filled with morselized autograft and allograft.

9.  Posterolateral fusion on the right between L2-L3 and bilaterally between L5 
and S2.

10.  Use of intraoperative 3D Stealth Navigation.

11.  Use of intraoperative fluoroscopy, less than 1 hour physician time.

12.  Use of neuromonitoring.

13.  Use of the operating microscope.

14.  Injection of preservative-free intrathecal narcotics.



COMPLICATIONS:  None.



FINDINGS:  per imaging



ESTIMATED BLOOD LOSS:  900 mL of which 500 mL was given back by Cell Saver.



INDICATIONS:  The patient is a 53-year-old gentleman, who has undergone prior 
lumbar fusions with a known history of pseudarthrosis L5-S1 for which he 
underwent a prior surgery by myself for the revision of L5-S1.  He presented 
with worsening low back pain and had evidence of broken hardware and 
pseudoarthrosis at L5-S1 with adjacent level breakdown at L2-L3.  After 
discussion of the risks, benefits, and treatment alternatives, we decided to 
proceed forth with surgery as described above.



DESCRIPTION OF PROCEDURE:  Patient was brought to operating theater and 
underwent general endotracheal anesthesia without complications.  He had 
Venodynes, SHERI hose, and appropriate lines placed by Anesthesia.  He was then 
flipped prone on the Shaquille table.  All bony processes inspected and padded.  
The previous lumbar incision was identified and marked more cranially and 
cephalad.  This area was then prepped and draped in usual sterile surgical 
fashion.  A time-out was completed per protocol.  The patient received 
antibiotics within 1 hour of incision. 



The incision was infiltrated with Marcaine with epinephrine and taken down with 
the scalpel blade.  Using the plasma blade, we then took the incision down the 
midline over the L2-L3 level.  A subperiosteal dissection was carried on the 
left side between L2 and S1.  We identified the hardware bilaterally between L3 
and S1.  We then moved on to the right side where we again completed a 
subperiosteal dissection and identified the hardware at L3 through S1 on the 
right side.  Care was taken to avoid the previous medial laminotomy site from 
his previous surgeries.  We removed the cap screws and bilateral rods from the 
L3 through S1 levels and passed off the field.  Upon further exploration, he 
had evidence of loosening of the hardware on the left at S1 and right-sided S1 
both of which the screws were pulled out.  The left S1 screw was removed intact 
and had a large hole around the screw.  The right-sided S1 screw was noted to 
be broken mid shaft and I could not retrieve it given the depth of the location 
of the remainder of the screw itself.  I then attached the 3D Stealth 
Navigation clamp to the spinous process of L4 and completed a 3D Stealth 
Navigation spin.  Using 3D Stealth Navigation, we placed the  holes for 
the bilateral iliac screws.  All holes were manually palpated with no evidence 
of any cortical breaches.  We then tapped and placed 7.5 x 80 mm screws 
bilaterally into the iliac wings.  I then dissected down to S2 and attempted to 
place an S2 trans SI-alar screw, however, given the patient's anatomy, I was 
not able to do this and therefore I placed bilateral S2 screws in a bicortical 
manner.  These were both 7.0 x 40 mm screws.  I was not able to place the right-
sided S1 screw given the constraints of the broken hardware and the previous 
corpectomy from the previous anterior surgery.  I then placed bone cement into 
the left-sided S1 defect and placed a screw into the S1 defect with bone cement 
to augment the screw for stabilization.  We then used the 3D Stealth navigation 
system to place the bilateral L2 screws.  Both holes were then filled with 6.5 
x 50 mm screws bilaterally from the [employer] Solera 4.75 system.  Another  3D 
navigation spin demonstrated good placement of the hardware.  



At this point, we completed a decompressive laminectomy at L2-L3, with 
bilateral medial facetectomies and completed an aggressive facetectomy on the 
left side between L2 and L3.  We completed a left-sided L2 through L3 
diskectomy.  We prepared the construct plates and measured the interbody space.
  We placed an 8 x 28 mm titanium PEEK elevate cage filled with morselized 
autograft and allograft anteriorly and toward the midline.  We packed 
additional morcellized autograft in the disk space for the interbody fusion.  
We let down distraction and decorticated bone in the right side between L2-L3, 
left-sided L5-S1, and right-sided L5-S1.  The wound was irrigated copiously 
with bacitracin irrigation.  We then placed 2 rods into the heads of the screws 
between L2 and S2 and secured them down with cap screws which was then 
tightened to the 's setting.  We placed 2 lateral connectors and 
attached the iliac bolts to the rods between S1-S2.  We placed 2 cross links 
between L2-L3 and S1-S2 and also secured them down with cap screws which were 
tightened per the manufacture's setting.  The wound was irrigated copiously 
with bacitracin irrigation.  We injected preservative-free intrathecal 
narcotics.  



We placed morselized autograft and allograft on the right side between L2-L3, 
left-side L5-S2 and right side L5 to the posterolateral fusion.  Two drains 
were left in the subfascial space and the wound then closed in multiple layers 
using Vicryl sutures for the deep layers and Dermabond for the skin.  The 
patient's wounds were dressed sterilely.  He was still asleep at the time of 
this dictation.  There were no complications.  No known changes under 
neuromonitoring throughout the procedure. 



Please note, that this surgery was greater than 75% more challenging than the 
average surgery secondary to the patient having had 3 prior multiple posterior 
surgeries and given the amount of scar tissue, broken hardware and difficulty 
with his anatomy.







Job #:  [id number]

MTDD

## 2018-06-23 NOTE — POSTANESTH
Post Anesthetic Evaluation


Cardiovascular Status: Normal, Stable, Similar to Pre-Op Cond


Respiratory Status: Similar to Pre-op Cond.


Level of Consciousness/Mental Status: Can Participate in Eval, Moderately Sleepy


Pain Control: Adequate, Prn Tx Ordered


Nausea/Vomiting Control: Adequate, Prn Tx Ordered


Complications Possibly Related to Anesthesia: None Noted

## 2018-06-23 NOTE — PDANEPAE
ANE Past Medical History





- Cardiovascular History


Hx Hypertension: Yes


Hx Arrhythmias: No


Hx Chest Pain: No


Hx Coronary Artery / Peripheral Vascular Disease: No


Hx CHF / Valvular Disease: No


Hx Palpitations: No





- Pulmonary History


Hx COPD: No


Hx Asthma/Reactive Airway Disease: No


Hx Recent Upper Respiratory Infection: No


Hx Oxygen in Use at Home: No


Hx Sleep Apnea: Yes


Sleep Apnea Screening Result - Last Documented: Positive


Pulmonary History Comment: Sleep apnea TOLERATES  CPAP;





- Neurologic History


Hx Cerebrovascular Accident: No


Hx Seizures: No


Hx Dementia: No


Neurologic History Comment: NO HX OF SEZURES





- Endocrine History


Hx Diabetes: Yes


Hypothyroid: No


Hyperthyroid: No


Obesity: moderate


Endocrine History Comment: DM II -DX ~2010. HGB A 1 C ~ 7.6.





- Renal History


Hx Renal Disorders: Yes


Renal History Comment: PROBLEM EMPTYING BLADDER





- Liver History


Hx Hepatic Disorders: No





- Neurological & Psychiatric Hx


Hx Neurological and Psychiatric Disorders: Yes


Neurological / Psychiatric History Comment: BACK PAIN- PAIN RADIATES DOWN BILAT 

LEGS;.  BIPOLAR W/PSYCHOTIC TENDENCY;STABLE W/RX;





- Cancer History


Hx Cancer: No





- Congenital Disorder History


Hx Congenital Disorders: Yes


Congenital History Comment: KIDNEY CONGENITAL ABNORMALITY





- GI History


Hx Gastrointestinal Disorders: No


Gastrointestinal History Comment: Gas;





- Other Health History


Other Health History: 1 MISSING TOOTH-LOWER MOLAR.





- Chronic Pain History


Chronic Pain: Yes (BACK PAIN & SHOULDER L; b leg)





- Surgical History


Prior Surgeries: L5/S1 BX/NEG FOR INFECTION 4-18;.  L4/5, L5/S1 FUSION W/ 

INSTRUMENTATION 10/2015.  ING HERNIA REPAIR L.  KIDNEY SURG CHILDHOOD;.  

SHOULDER R REPLACEMENT.  VASECTOMY.  COLONOSCOPIES X2.  MASSES REMOVED FROM 

CHEST AREA;





ANE Review of Systems


Review of Systems: 








- Exercise capacity


METS (RN): 3 METS





ANE Patient History





- Allergies


Allergies/Adverse Reactions: 








carbamazepine [From Tegretol] Allergy (Verified 06/04/18 16:55)


 Hives


ramelteon [From Rozerem] Allergy (Verified 06/04/18 16:55)


 Hives


vortioxetine hydrobromide [From Brintellix] Allergy (Verified 06/04/18 16:55)


 Hives


anticonvulsant Allergy (Uncoded 06/04/18 16:55)


 Hives








- Home Medications


Home Medications: 








Ascorbic Acid [Vitamin C 500 mg (*)] 1,000 mg PO DAILY@0700 09/17/15 [Last 

Taken 06/08/18]


Benztropine Mesylate [Cogentin] 0.5 mg PO DAILY 09/17/15 [Last Taken 06/22/18]


Gabapentin [Neurontin 300 MG (*)] 600 mg PO TID@0700,1400,2100 09/17/15 [Last 

Taken 06/22/18]


Metoprolol Tartrate [Lopressor 100 mg (*)] 100 mg PO BID@07,18 09/17/15 [Last 

Taken 06/21/18]


amLODIPine BESYLATE [Norvasc 10 mg (*)] 10 mg PO DAILY@07 09/17/15 [Last Taken 

06/22/18]


clonazePAM [Klonopin (*)] 0.5 mg PO TID@0700,1400,1800 09/17/15 [Last Taken 06/ 22/18]


Allopurinol [Allopurinol 300 MG (RX)] 300 mg PO BID@07,18 02/17/17 [Last Taken 

06/22/18]


Amantadine HCl [Symmetrel] 100 mg PO BID 02/17/17 [Last Taken 06/21/18]


Atorvastatin Calcium [Lipitor 20 mg (*)] 20 mg PO HS 02/17/17 [Last Taken 06/21/ 18]


Colchicine [Colchicine (*)] 1.8 mg PO DAILY PRN 02/17/17 [Last Taken 05/23/18]


Dextroamphetamine/Amphetamine [Adderall 30 mg Tablet] 30 mg PO BID@07,12 02/17/ 17 [Last Taken 06/21/18]


Divalproex Sodium [Depakote] 1,000 mg PO DAILY@16 02/17/17 [Last Taken 06/21/18]


Lisinopril [Zestril 40 mg (*)] 40 mg PO DAILY 02/17/17 [Last Taken 06/21/18]


Melatonin 5 mg PO HS 02/17/17 [Last Taken 06/08/18]


Ziprasidone HCl [Geodon] 80 mg PO TID 02/17/17 [Last Taken 06/22/18]


metFORMIN HCL [Glucophage 1000 mg] 1,000 mg PO BIDMEAL 02/17/17 [Last Taken 06/ 20/18]


Doxazosin Mesylate [Cardura 4 MG (*)] 8 mg PO DAILY 04/27/18 [Last Taken 06/22/ 18]


Glucosamine-Chondroitin Tablet 1,500 mg PO BID 04/27/18 [Last Taken 06/08/18]


Ipratropium 0.06% Nasal 1 spray EACHNARE TID 04/27/18 [Last Taken 06/22/18]


Vraylar 4.5 mg PO DAILY@21 04/27/18 [Last Taken 06/21/18]


Acetaminophen [Tylenol  mg (*)] 1,000 mg PO BID@07,15 06/04/18 [Last 

Taken 06/22/18]


C/E/Zn/Cu/OM3/DHA/EPA/LUT/ZEAX [Preservision Areds 2 Softgel] 1 each PO BID 06/ 04/18 [Last Taken 06/08/18]


Cholecalciferol Vit D3 [Vitamin D3 (*)] 2,000 units PO DAILY 06/04/18 [Last 

Taken 06/08/18]


Divalproex [Depakote] 1,500 mg PO DAILY@08 06/04/18 [Last Taken 06/22/18]


Furosemide [Lasix 40 MG (*)] 40 mg PO DAILY PRN 06/04/18 [Last Taken 06/08/18]


Levothyroxine [Synthroid 50 mcg (*)] 50 mcg PO DAILY06 06/04/18 [Last Taken 06/ 22/18]


Multivitamins [Multivitamin (*)] 1 each PO DAILY 06/04/18 [Last Taken 06/08/18]


Omega-3 Fatty Acids [Fish Oil 1000 mg (*)] 1,000 mg PO BID 06/04/18 [Last Taken 

06/08/18]


Zolpidem Tartrate [Ambien] 10 mg PO HS 06/04/18 [Last Taken 06/21/18]


tiZANidine HCL [Zanaflex] 4 mg PO BID@07,15 06/04/18 [Last Taken 06/22/18]








- NPO status


NPO Since - Liquids (Date): 06/23/18


NPO Since - Liquids (Time): 06:00


NPO Since - Solids (Date): 06/22/18


NPO Since - Solids (Time): 22:00





- Anes Hx


Anes Hx: no prior problems





- Smoking Hx


Smoking Status: Former smoker





- Alcohol Use


Alcohol Use: None





- Family Anes Hx


Family Hx Anesthesia Complications: NEG





ANE Labs/Vital Signs





- Labs


Result Diagrams: 


 06/23/18 07:45





 06/23/18 07:45





- Vital Signs


Blood Pressure: 99/60


Heart Rate: 66


Respiratory Rate: 14


O2 Sat (%): 96


Height: 182.88 cm


Weight: 140.614 kg





ANE Physical Exam





- Airway


Mallampati Score: Class 3


Mouth exam: normal dental/mouth exam





- Pulmonary


Pulmonary: no respiratory distress, no rales or rhonchi, clear to auscultation





- Cardiovascular


Cardiovascular: regular rate and rhythym, no murmur, rub, or gallop





- ASA Status


ASA Status: III





ANE Anesthesia Plan


Anesthesia Plan: general endotracheal anesthesia

## 2018-06-24 NOTE — SOAPPROG
SOAP Progress Note


Assessment/Plan: 


Assessment:


53 male POD #2 s/p Redo Anterior interbody fusion on 6/22 with Gen surg 

assistance and POD #1 sp L2-S2 fusion with L2/3 TLIF


Hypotensive this AM responded to bolus of NS and 250ml Albumin


Worked with PT/OT this AM and was standing and walking short dist.


Now sleepy after he was given all of his morning meds. 








Plan:


Follow HH, urine output and pressures, he may need more albumin/fluids since he 

lost nearly 1 liter yesterday.


PT/OT as tolerated


Continue BRANDON x 2


D/W Dr. Henry





Subjective: 





Lying in bed comfortable, but falls asleep quickly. Arousable to follow 

commands weakly.


On 


Objective: 





 Vital Signs











Temp Pulse Resp BP Pulse Ox


 


 37.9 C   83   18   84/50 L  96 


 


 06/24/18 08:00  06/24/18 10:00  06/24/18 10:00  06/24/18 10:00  06/24/18 10:00








 Microbiology











 06/22/18 10:53 Gram Stain - Final





 Back - Other 


 


 06/22/18 10:16 Gram Stain - Final





 Other - Other 








 Laboratory Results





 06/24/18 05:40 





 06/24/18 05:40 





 











 06/23/18 06/24/18 06/25/18





 05:59 05:59 05:59


 


Intake Total 5845 3606 


 


Output Total 3110 1330 


 


Balance 2735 2276 








 











PT  13.8 SEC (12.0-15.0)   06/22/18  16:35    


 


INR  1.04  (0.83-1.16)   06/22/18  16:35    








Neuro:


Follows commands x 4, 5/5 bilat DF/PF/EHL


pulls both legs up and down bed


sens +LT


Left  weaker than right


PERRLA





Dressing: CDI


BRANDON Left: 375 ml


BRANDON right:355ml 





ICD10 Worksheet


Patient Problems: 


 Problems











Problem Status Onset


 


BRIAN (acute kidney injury) Acute  


 


Arthrodesis status Acute  


 


Diabetes Acute  


 


HTN (hypertension) Acute  


 


Lumbago due to displacement of intervertebral disc Acute  


 


Lumbar stenosis Acute  


 


Pain Acute

## 2018-06-24 NOTE — ECHO
[email or web address]





[address] [address] 

Main: [phone] 



Fax: 



Transthoracic Echocardiogram 

Name:             RICH HECK                    MR#:

[id number]

Study Date:       2018                            Study Time:

01:07 PM

YOB: 1965                            Age:

53 year(s)

Height:           182.9 cm (72 in.)                     Weight:

140.62 kg (310 lb.)

BSA:              2.57 m2                               Gender:

Male

Examination:      Echo                                  Indication:

HYPOTENSION

Image Quality:    Technically Difficult                 Contrast: 

Requested by:     Cortes Uriostegui                          BP:

119 mmHg/62 mmHg

Heart Rate:                                             Rhythm: 

Indication:       HYPOTENSION 



Procedure Staff 

Ultrasound Technician:   Stacey Mccabe [facility] 

Reading Physician:       Wnig Johnson MD 

Requesting Provider: 



Conclusions:           Normal size left ventricle.  

No LV hypertrophy.  

Normal global systolic LV function.  

EF is 62 %.  

The mitral valve is normal in appearance and function.  

Technically difficult valvular evaluation.  

The aortic valve is normal in appearance and function.  

Technically difficult valvular evaluation.  

Normal size aortic root measuring 2.9 cm.  

Echo images were poor. If clinically indicated consider MOISES modality

for further in-depth assessment.

None of the valves appeared to be overtly pathologic. 



Measurements: 

Chambers                    Valvular Assessment AV/MV

Valvular Assessment TV/PV



Normal                                   Normal

Normal

Name         Value     Range              Name         Value Range

Name           Value Range

Ao Mita (2D): 2.9 cm    (1.4 cm-2.6            AV Vmax:     1.50 m/s (1

m/s-1.7       PV Vmax:       1.29 m/s (0.6 m/s-0.9



cm)                                  m/s)

m/s)

IVSd (2D):   1.2 cm (0.6 cm-1.1               AV maxP mmHg ( -

)          PV PGmax:      7  mmHg ( - )



cm)                AV meanP mmHg ( - )  

LVDd (2D):   5.0 cm    (4.2 cm-5.9            RIZWAN (VTI):   3.2 cm ( -

)



cm)                MV E Vmax:   1.09 m/s ( - )  

LVDs (2D):   3.4 cm    (2.1 cm-4              MV A Vmax:   0.88 m/s (

- )



cm)                MV E/A:      1.24 ( - )  

LVPWd (2D):  1.2 cm    (0.6 cm-1 



cm)                    MV PHT:      0.030 s ( - )  

LVOTd        2.0 cm    2.0 cm mm              MVA (PHT):   7.3 s ( - )





LVEF (BP):   62 %      (>=55 %)   

RVDd(2D):    2.6 cm    (1.9 cm-3.8 



cmmm)  



Patient: RICH HECK                   MRN: [id number]

Study Date: 2018   Page 1 of 2

01:07 PM 









Continued Measurements: 

Chambers                      Valvular Assessment AV/MV

Valvular Assessment TV/PV



Name                       Value          Name

Value    Name                      Value

LADs:                   3.9 cm                MV DecTime:

109 m/s     CVP (est.):             5 mmHg

LADs Lon.0 cm                MV E' Septal:

0.10  m/s

LA Area:                16.4 cm2          MV E/E' Septal:        11.40



LA Volume:              47 ml             MV E/E' Lateral:       9.50



LA Volume Index:        18.3 ml/m2   



Additional Vessels  



Name                       Value  

Ao Ascendin.7 cm    



Findings:              Left Ventricle: 

Normal size left ventricle. No LV hypertrophy. Normal global systolic

LV function. EF is 62 %. No

regional wall motion abnormality. Normal diastolic LV function.  

Right Ventricle: 

Normal size right ventricle. Normal RV function.  

Left Atrium: 

The left atrium is normal in size.  

Right Atrium: 

The right atrium is normal in size.  

Mitral Valve: 

The mitral valve is normal in appearance and function. Technically

difficult valvular evaluation.

Aortic Valve: 

The aortic valve is normal in appearance and function. Technically

difficult valvular evaluation.

Tricuspid Valve: 

The tricuspid valve is normal in appearance and function. Technically

difficult valvular evaluation.

Pulmonic Valve: 

The pulmonic valve is normal in appearance and function.  

Aorta: 

The aorta is normal. Normal size aortic root measuring 2.9 cm. Normal

size ascending aorta

measuring 2.7 cm.  

Pericardium: 

No pericardial effusion. No pleural effusion.  

Exam Comments: 

Technically difficult, patient supine, obese, on vent. 







Electronically signed by Wing Johnson MD on 2018 at 03:42 PM 

(No Signature Object) 



Patient: RICH HECK                   MRN: [id number]

Study Date: 2018   Page 2 of 2

01:07 PM 







[id number]

## 2018-06-24 NOTE — GPN
[f rep st]



                                                                 PROCEDURE NOTE





DATE OF PROCEDURE:  06/24/2018



PROCEDURE:  Central line placement.



REASON FOR THE PROCEDURE:  Hypotension and altered mental status, poor IV access.



PROCEDURE NOTE:  The risks and benefits of the procedure were explained to the patient, who agreed ve
rbally to proceed.  Due to the emergent nature of the procedure, written consent was not obtained.  A
fter a time-out, the patient was prepped and draped in the usual sterile fashion after interrogating 
the right neck with ultrasound and identifying an appropriate location.  A finder needle was advanced
 under ultrasound guidance into the jugular vein without difficulty on the 1st pass.  This was then w
ithdrawn, and a thin-walled needle was advanced over the same site, again with ultrasound guidance.  
The vein was entered on the 1st pass.  A wire was passed through this, and a catheter was placed thro
ugh the Seldinger technique without difficulty.  All 3 ports withdrew dark blood and flushed easily. 
 The catheter was sewn in place, and a sterile dressing was applied.  A postprocedure chest x-ray merline
wed appropriate position of the tip of the catheter with no pneumothorax.  Estimated blood loss was 5
 cc.





Job #:  [id number]

## 2018-06-24 NOTE — SOAPPROG
SOAP Progress Note


Assessment/Plan: 


Assessment:





54 y/o M s/o redo anterior/posterior spinal fusion POD #2





S: Laying in bed after PT session. Per RN, his pressure dropped to 80s/50s 

during session.  Pt denies symptoms.  Tolerating clears, but not passing gas or 

BM yet.





O: Alert


Diaphoretic


Temp: 100.2


Tachycardic in 120s


BP 80s/50s


Creatinine: up to 2.4


K: 6.2


Decreasing urine output


Abdomen: soft, but distended, hypoactive bowel sounds, mild shadowing on 

dressing





Plan:  Will defer to neurosurg for current tachycardia, hypotension and 

decreased kidney function, as they are primary. Will change IV fluids to NS.  

Continue clear liquid diet.  





06/24/18 08:58





Objective: 





 Vital Signs











Temp Pulse Resp BP Pulse Ox


 


 37.1 C   69   15   122/57 H  95 


 


 06/24/18 05:00  06/24/18 08:08  06/24/18 06:00  06/24/18 08:12  06/24/18 06:00








 Microbiology











 06/22/18 10:53 Gram Stain - Final





 Back - Other 


 


 06/22/18 10:16 Gram Stain - Final





 Other - Other 








 Laboratory Results





 06/24/18 05:40 





 06/24/18 05:40 





 











 06/23/18 06/24/18 06/25/18





 05:59 05:59 05:59


 


Intake Total 5845 3606 


 


Output Total 3110 1330 


 


Balance 2735 2276 








 











PT  13.8 SEC (12.0-15.0)   06/22/18  16:35    


 


INR  1.04  (0.83-1.16)   06/22/18  16:35    














ICD10 Worksheet


Patient Problems: 


 Problems











Problem Status Onset


 


BRIAN (acute kidney injury) Acute  


 


Arthrodesis status Acute  


 


Diabetes Acute  


 


HTN (hypertension) Acute  


 


Lumbago due to displacement of intervertebral disc Acute  


 


Lumbar stenosis Acute  


 


Pain Acute

## 2018-06-24 NOTE — PDINTPN
Intensivist Progress Note


Assessment/Plan: 


Assessment:


Acute blood loss anemia: Had EBL 2.5 liters 6/22. Initial post-op Hgb normal, 

now this has fallen as expected. No signs of significant ongoing blood loss/

coagulopathy. 


S/P L-spine surgery: Anterior approach 6/22, posterior approach delayed until 6/ 23. Pain control OK


BRIAN: Likely due to pre-renal/hypovolemia after large acute blood loss 6/22. 

Urine output OK, but Cr continues to climb.


Hypotension: Suspect this is due to blood loss, now with 3rd spacing. Cardiac 

causes, PE possible but less likely. Acidosis could contribute. 


Hyperkalemia: Likely due to renal insufficiency, possibly acidosis.


BRYANT: On CPAP/oxygen, with good saturations at night.





Plan: After being CTSP emergently due to hypotension and hyperkalemia, an 

Albumin bolus was given and I placed a RIJ TLC without difficulty. Labs 

revealed pH 7.17, Cr up to 2.9, Hgb 9.0, CVP 13 (on BiPAP). Another liter of 

IVF ordered, BP improving. Tried CPAP, but he's having hypoxemia with mouth 

breathing. Changed to BiPAP/AVAPS with FFM, sats improved.


Will order troponins, ECHO, US legs, repeat ABG and Chem-7. Consider CTH if 

mental status doesn't improve with resolution of acidosis.


D/W wife, NS


65 minutes CC time addressing hypotension, acidosis.





06/24/18 12:35





Subjective: 


Less responsive this morning, no complaints


Objective: 





 Vital Signs











Temp Pulse Resp BP Pulse Ox


 


 37.9 C   83   18   84/50 L  96 


 


 06/24/18 08:00  06/24/18 10:00  06/24/18 10:00  06/24/18 10:00  06/24/18 10:00








 Microbiology











 06/22/18 10:53 Gram Stain - Final





 Back - Other 


 


 06/22/18 10:16 Gram Stain - Final





 Other - Other 








 Laboratory Results





 06/24/18 11:51 





 06/24/18 05:40 





 











 06/23/18 06/24/18 06/25/18





 05:59 05:59 05:59


 


Intake Total 5845 3606 


 


Output Total 3110 1330 


 


Balance 2735 2276 








 











PT  13.8 SEC (12.0-15.0)   06/22/18  16:35    


 


INR  1.04  (0.83-1.16)   06/22/18  16:35    








CXR: RIJ in appropriate position, atelectasis, no PTX. Images reviewed by me. 





Physical Exam





- Physical Exam


General Appearance: mild distress, No alert


EENT: normal ENT inspection


Neck: normal inspection


Respiratory: lungs clear, normal breath sounds


Cardiac/Chest: regular rate, rhythm, edema (1+)


Abdomen: normal bowel sounds, non-tender


Skin: normal color, warm/dry


Extremities: normal inspection


Neuro/Psych: No alert, No oriented x 3, No motor weakness





ICD10 Worksheet


Patient Problems: 


 Problems











Problem Status Onset


 


BRIAN (acute kidney injury) Acute  


 


Arthrodesis status Acute  


 


Diabetes Acute  


 


HTN (hypertension) Acute  


 


Lumbago due to displacement of intervertebral disc Acute  


 


Lumbar stenosis Acute  


 


Pain Acute

## 2018-06-25 NOTE — PDINTPN
Intensivist Progress Note


Assessment/Plan: 


Assessment:


 


S/P L-spine surgery: Anterior approach 6/22, posterior approach delayed until 6/ 23. Pain control OK





Acute blood loss anemia: Had EBL 2.5 liters 6/22. Initial post-op Hgb normal, 

now below 8.  For 1 unit of packed cells today. No signs of significant ongoing 

blood loss/coagulopathy.





BRIAN: Likely due to pre-renal/hypovolemia after large acute blood loss 6/22. 

Urine output OK. Cr 1.4, improved.  Will hold lisinopril





Hypotension:  Resolved for the most part.  Blood pressure dips with 

antihypertensive medications. 





Hyperkalemia: Likely due to renal insufficiency, possibly acidosis.  Improved, 

potassium 4.9 today.





Diabetes:  On sliding scale insulin and metformin.  Glucoses below 200.





Volume overload:  Edematous secondary to fluid resuscitation.  On Lasix.





BRYANT: On CPAP/oxygen, with good saturations at night.





Prophylaxis:  On famotidine and Lovenox.











Plan:


Continue care in the intensive care unit.


Hold lisinopril.  Continue other antihypertensives.


Lasix POA and IV p.r.n.  


1 unit packed red blood cells today.


Follow laboratory.  


Increase mobilization as tolerated.





30 min of critical care time spent directly with the patient.  Discussed with 

nursing and ICU multi disciplinary team.























Subjective: 





Somnolent, arouses, up in chair.  Brace in place.


Objective: 





 Vital Signs











Temp Pulse Resp BP Pulse Ox


 


 37.5 C   87   18   110/57 L  98 


 


 06/25/18 08:10  06/25/18 14:00  06/25/18 14:00  06/25/18 14:00  06/25/18 14:00








 Microbiology











 06/22/18 10:53 Gram Stain - Final





 Back - Other 


 


 06/22/18 10:16 Gram Stain - Final





 Other - Other 








 Laboratory Results





 06/25/18 04:30 





 06/25/18 04:30 





 











 06/24/18 06/25/18 06/26/18





 05:59 05:59 05:59


 


Intake Total 3606 5285 875


 


Output Total 1330 2955 


 


Balance 2276 2330 875








 











PT  13.8 SEC (12.0-15.0)   06/22/18  16:35    


 


INR  1.04  (0.83-1.16)   06/22/18  16:35    














ICD10 Worksheet


Patient Problems: 


 Problems











Problem Status Onset


 


BRIAN (acute kidney injury) Acute  


 


Arthrodesis status Acute  


 


Diabetes Acute  


 


HTN (hypertension) Acute  


 


Lumbago due to displacement of intervertebral disc Acute  


 


Lumbar stenosis Acute  


 


Pain Acute

## 2018-06-25 NOTE — CPEKG
Heart Rate: 115

RR Interval: 522

P-R Interval: 172

QRSD Interval: 70

QT Interval: 296

QTC Interval: 410

P Axis: 65

QRS Axis: 27

T Wave Axis: -20

EKG Severity - BORDERLINE ECG -

EKG Impression: SINUS TACHYCARDIA

EKG Impression: BORDERLINE T ABNORMALITIES, INFERIOR LEADS

Electronically Signed By: Wing Johnson 27-Jun-2018 07:32:38

## 2018-06-25 NOTE — NEUSURGPN
Assessment/Plan: 





Assessment:


53 male POD #3 s/p Redo Anterior interbody fusion on 6/22 with Gen surg 

assistance and POD #2 sp L2-S2 fusion with L2/3 TLIF








Plan:


Follow HH, urine output (continue Marquez for close I&O);  Will transfuse 1 unit 

PRBC, for tachycardiac and hemoglobin 7.8 this am


Continue BRANDON x 2, high output over night


Diet per Gen surg


DVT prophx: TEDs, SCDs, Lovenox


Ppost op xrays pending


LSO when OOB


Optimize pain management


PT/OT


Seen by Dr. Gonzalez and myself.


Subjective: 


Denies any new leg pain, numbness or tingling. Not passing gas


Objective: 


NAD A&Ox3 MAEX4 5/5 and equal in BUE and BLE. Incisions c/d/i. BRANDON drain 

serosanguineous x2, +LT 


Catheter Insertion Date: 06/22/18





- Physician


Patient Seen by : Carlos





Neurosurgery Physical Exam





- Vitals, I&O, Labs





 I and O











 06/24/18 06/25/18 06/26/18





 05:59 05:59 05:59


 


Intake Total 3606 5285 


 


Output Total 1330 2955 


 


Balance 2276 2330 


 


Weight 140.614 kg  


 


Intake:   


 


  Oral (ml) 1100 1150 


 


  IV Infused (ml) 2506 4135 


 


    Calcium Gluconate 50 ml @  50 





    100 mls/hr IV ONCE ONE   





    Rx#:[id number]   


 


    NS W/ 20 KCl/L 1,000 ml @ 2506  





    75 mls/hr IV CONT NUBIA Rx   





    #:[id number]   


 


    Ns 1,000 ml @ 125 mls/hr  4030 





    IV CONT NUBIA Rx#:   





    [id number]   


 


    ceFAZolin 1 GM/DEXTROSE  55 





    50 ml @ 200 mls/hr IV   





    Q8HRS NUBIA Rx#:[id number]   


 


Output:   


 


  Urine (ml) 600 2410 


 


    Catheter 600 2410 


 


  BRANDON Drain Output (ml) 730 545 


 


    Left Posterior Back 375 340 





    Shaquille Harry   


 


    Right Posterior Back 355 205 





    Shaquille Harry   


 


Other:   


 


  Number of Stools   


 


    Catheter 0  








 Microbiology











 06/22/18 10:53 Gram Stain - Final





 Back - Other 


 


 06/22/18 10:16 Gram Stain - Final





 Other - Other 








 Vital Signs











Temp Pulse Resp BP Pulse Ox


 


 37.5 C   110 H  15   140/73 H  98 


 


 06/25/18 08:10  06/25/18 08:10  06/25/18 08:10  06/25/18 08:10  06/25/18 08:10








 Laboratory Results





 06/25/18 04:30 





 06/25/18 04:30 











ICD10 Worksheet


Patient Problems: 


 Problems











Problem Status Onset


 


BRIAN (acute kidney injury) Acute  


 


Arthrodesis status Acute  


 


Diabetes Acute  


 


HTN (hypertension) Acute  


 


Lumbago due to displacement of intervertebral disc Acute  


 


Lumbar stenosis Acute  


 


Pain Acute

## 2018-06-26 NOTE — SOAPPROG
SOAP Progress Note


Assessment/Plan: 


Assessment:


53 male POD #24 s/p Redo Anterior interbody fusion on 6/22 with Gen surg 

assistance and POD #3 sp L2-S2 fusion with L2/3 TLIF


Doing well today. Oriented, pain controlled. 








Plan:


Advance activity as tolerated with PT/OT


Left BRANDON removed today , continue Right BRANDON


Change dressing


Medicine/crit continue to follow


D/W Dr. Gonzalez


06/26/18 07:42





Subjective: 





lying in bed, comfortable, pain is controlled well currently


No new issues. 


Objective: 





 Vital Signs











Temp Pulse Resp BP Pulse Ox


 


 37.7 C   126 H  24 H  163/63 H  95 


 


 06/26/18 06:15  06/26/18 07:24  06/26/18 06:15  06/26/18 07:24  06/26/18 06:15








 Microbiology











 06/22/18 10:53 Gram Stain - Final





 Back - Other 


 


 06/22/18 10:16 Gram Stain - Final





 Other - Other 








 Laboratory Results





 06/26/18 06:45 





 06/26/18 06:45 





 











 06/25/18 06/26/18 06/27/18





 05:59 05:59 05:59


 


Intake Total 5285 3595 


 


Output Total 2955 4785 


 


Balance 2330 -1190 








 











PT  13.8 SEC (12.0-15.0)   06/22/18  16:35    


 


INR  1.04  (0.83-1.16)   06/22/18  16:35    








Incision: CDI


Right BRANDON 35


left BRANDON 20ml - removed today





Neuro:


A+Ox4


follows commands


sens +Lt


5/5  bilateral DF/PF/EHL


flexex and extends quads iliopsoas


stands and pivots to bedside commode with PT





ICD10 Worksheet


Patient Problems: 


 Problems











Problem Status Onset


 


BRIAN (acute kidney injury) Acute  


 


Arthrodesis status Acute  


 


Diabetes Acute  


 


HTN (hypertension) Acute  


 


Lumbago due to displacement of intervertebral disc Acute  


 


Lumbar stenosis Acute  


 


Pain Acute

## 2018-06-26 NOTE — SOAPPROG
SOAP Progress Note


Assessment/Plan: 


Assessment/Plan: 


54 y/o M s/o redo anterior/posterior spinal fusion with abdominal exposure





Patient seen and examined by Dr. Alonso. MARTHA Orozco scribing for Dr. Alonso:





Patient awake, alert. Passing gas. Tolerating clear liquids. AFVSS. Wounds 

intact/ok. 





06/26/18 16:43





Objective: 





 Vital Signs











Temp Pulse Resp BP Pulse Ox


 


 37.2 C   104 H  22 H  100/48 L  91 L


 


 06/26/18 16:00  06/26/18 16:00  06/26/18 16:00  06/26/18 16:00  06/26/18 16:00








 Microbiology











 06/22/18 10:53 Gram Stain - Final





 Back - Other 


 


 06/22/18 10:16 Gram Stain - Final





 Other - Other 


 


 06/22/18 10:53 Mycobacterial Smear (NATHAN) - Final





 Back - Other 








 Laboratory Results





 06/26/18 06:45 





 06/26/18 14:50 





 











 06/25/18 06/26/18 06/27/18





 05:59 05:59 05:59


 


Intake Total 5285 3595 


 


Output Total 2955 4785 275


 


Balance 2330 -1190 -275








 











PT  13.8 SEC (12.0-15.0)   06/22/18  16:35    


 


INR  1.04  (0.83-1.16)   06/22/18  16:35    














ICD10 Worksheet


Patient Problems: 


 Problems











Problem Status Onset


 


BRIAN (acute kidney injury) Acute  


 


Arthrodesis status Acute  


 


Diabetes Acute  


 


HTN (hypertension) Acute  


 


Lumbago due to displacement of intervertebral disc Acute  


 


Lumbar stenosis Acute  


 


Pain Acute

## 2018-06-26 NOTE — PDINTPN
Intensivist Progress Note


Assessment/Plan: 


Assessment:


 


S/P L-spine surgery: Anterior approach 6/22, posterior approach delayed until 6/ 23. Pain control OK





Acute blood loss anemia: Had EBL 2.5 liters 6/22. Initial post-op Hgb normal, 

then fell to 7.8.  Status post 1 L of pack cells 6/25: Hb 8.3 today No signs of 

significant ongoing blood loss/coagulopathy.





BRIAN: Likely due to pre-renal/hypovolemia after large acute blood loss 6/22. 

Urine output OK. Cr 1.4 yesterday, improved.  Basic metabolic panel pending 

from today.  Lisinopril on hold.





Hypotension:  Resolved for the most part.  Blood pressure dips with 

antihypertensive medications. 





Hyperkalemia: Likely due to renal insufficiency, possibly acidosis.  Improved, 

potassium 4.9 today.





Diabetes:  On sliding scale insulin and metformin.  Glucoses below 200.





Volume overload:  Edematous secondary to fluid resuscitation.  On Lasix p.r.n., 

generally daily.  Good urine output last 24 hr.





BRYANT: On CPAP/oxygen, with good saturations at night.





Prophylaxis:  On famotidine and Lovenox.











Plan:


Continue care in the intensive care unit.


Hold lisinopril.  Continue other antihypertensives.


Lasix IV p.r.n:  Daily if tolerated.  Follow input and output  


Follow CBC.


Follow laboratory.  


Increase mobilization.








25 min of critical care time spent directly with the patient.  Discussed with 

patient, nursing and ICU multi disciplinary team.

















Subjective: 





Sleeping, arouses easily, responsive.  Back pain under good control.  No other 

complaints.


Objective: 





 Vital Signs











Temp Pulse Resp BP Pulse Ox


 


 36.7 C   76   17   107/64   97 


 


 06/26/18 07:44  06/26/18 10:00  06/26/18 10:00  06/26/18 10:00  06/26/18 10:00








 Microbiology











 06/22/18 10:53 Gram Stain - Final





 Back - Other 


 


 06/22/18 10:16 Gram Stain - Final





 Other - Other 








 Laboratory Results





 06/26/18 06:45 





 06/26/18 06:45 





 











 06/25/18 06/26/18 06/27/18





 05:59 05:59 05:59


 


Intake Total 5277 3598 


 


Output Total 2773 5386 


 


Balance 2330 -1190 








 











PT  13.8 SEC (12.0-15.0)   06/22/18  16:35    


 


INR  1.04  (0.83-1.16)   06/22/18  16:35    














Physical Exam





- Physical Exam


General Appearance: alert, no apparent distress


EENT: PERRL/EOMI, other (Nasal cannula at 3 L)


Neck: normal inspection (Large neck)


Respiratory: lungs clear (Anteriorly), decreased breath sounds (At bases), No 

rhonchi, No wheezing


Cardiac/Chest: regular rate, rhythm (After metoprolol.  Blood pressure lower 

with this.  Heart rate and blood pressure goes up in between.)


Abdomen: non-tender, soft (Obese), No normal bowel sounds (Decreased, bowel 

sounds present)


Male Genitalia: other (Marquez catheter in place, good urine output)


Skin: warm/dry, pallor


Extremities: pedal edema


Neuro/Psych: no motor/sensory deficits, No cognition abnormalities





ICD10 Worksheet


Patient Problems: 


 Problems











Problem Status Onset


 


Lumbar stenosis Acute  


 


Diabetes Acute  


 


Pain Acute  


 


Lumbago due to displacement of intervertebral disc Acute  


 


Arthrodesis status Acute  


 


HTN (hypertension) Acute  


 


BRIAN (acute kidney injury) Acute

## 2018-06-26 NOTE — ASMTCMCOM
CM Note

 

CM Note                       

Notes:

Therapies recommending SNF Rehab. Spoke to patient and mother. He has been at [facility] in the past 


and would like to return there for rehab. Referral sent. [facility] contacted.

 

Date Signed:  06/26/2018 04:20 PM

Electronically Signed By:Camille Up LCSW

## 2018-06-27 NOTE — SOAPPROG
SOAP Progress Note


Assessment/Plan: 


Assessment:





52 y/o M s/o redo anterior/posterior spinal fusion POD #2





S: Laying in bed, no complaints.  Reports passing minimal gas, but no BM.  

Denies abdominal pain. 





O: Sleepy


Kidney function improving, Cr down to 1.4, K improved as well


HR in 80s during visit


BP improved


Urine output improved


Abdomen: soft, but distended, hypoactive bowel sounds, mild shadowing on 

dressing





Plan: Continue clear liquid diet.  1u prbc per neurosurg.





06/25/18 08:54





06/27/18 08:40


Tolerating clears.  Passing gas.  Abdominal dressing changed today.  Incision 

is cdi.  Advance to light diet.  


Objective: 





 Vital Signs











Temp Pulse Resp BP Pulse Ox


 


 37.2 C   89   17   118/72   93 


 


 06/27/18 08:00  06/27/18 08:00  06/27/18 08:00  06/27/18 08:00  06/27/18 08:00








 Microbiology











 06/22/18 10:53 Gram Stain - Final





 Back - Other 


 


 06/22/18 10:16 Gram Stain - Final





 Other - Other 


 


 06/22/18 10:53 Mycobacterial Smear (NATHAN) - Final





 Back - Other 








 Laboratory Results





 06/27/18 07:40 





 06/27/18 07:40 





 











 06/26/18 06/27/18 06/28/18





 05:59 05:59 05:59


 


Intake Total 3595 2100 


 


Output Total 4785 840 


 


Balance -1190 1260 








 











PT  13.8 SEC (12.0-15.0)   06/22/18  16:35    


 


INR  1.04  (0.83-1.16)   06/22/18  16:35    














ICD10 Worksheet


Patient Problems: 


 Problems











Problem Status Onset


 


BRIAN (acute kidney injury) Acute  


 


Arthrodesis status Acute  


 


Diabetes Acute  


 


HTN (hypertension) Acute  


 


Lumbago due to displacement of intervertebral disc Acute  


 


Lumbar stenosis Acute  


 


Pain Acute

## 2018-06-27 NOTE — SOAPPROG
SOAP Progress Note


Assessment/Plan: 


Assessment:


53 male POD #5s/p Redo Anterior interbody fusion on 6/22 with Gen surg 

assistance and POD #4 sp L2-S2 fusion with L2/3 TLIF


Doing well today. Ambulating hallway easily with walker. 








Plan:


Advance activity as tolerated with PT/OT


OK for floor - transfer order placed


Diet per Gen surg.


Will pull remaining BRANDON  today per Dr. Gonzalez 


D/W Dr. Gonzalez








06/27/18 07:44





06/27/18 08:03





Subjective: 





walking hallway with walker, doing well, no complaints. He is hungry, still on 

CLD, passing gas.


Objective: 





 Vital Signs











Temp Pulse Resp BP Pulse Ox


 


 36.8 C   113 H  18   158/85 H  95 


 


 06/27/18 04:00  06/27/18 07:00  06/27/18 04:00  06/27/18 07:00  06/27/18 04:00








 Microbiology











 06/22/18 10:53 Gram Stain - Final





 Back - Other 


 


 06/22/18 10:16 Gram Stain - Final





 Other - Other 


 


 06/22/18 10:53 Mycobacterial Smear (NATHAN) - Final





 Back - Other 








 Laboratory Results





 06/26/18 06:45 





 06/26/18 14:50 





 











 06/26/18 06/27/18 06/28/18





 05:59 05:59 05:59


 


Intake Total 3595 2100 


 


Output Total 4785 840 


 


Balance -1190 1260 








 











PT  13.8 SEC (12.0-15.0)   06/22/18  16:35    


 


INR  1.04  (0.83-1.16)   06/22/18  16:35    








right BRANDON: 40 ml


Neuro:


Ambulating hallway easily with walker


5/5  bilat LE, sens +Lt


Incision: CDI





Xrays from 6/26 [facility]: images degraded by motion artifact, but show well 

positioned hardware L2-S2.





ICD10 Worksheet


Patient Problems: 


 Problems











Problem Status Onset


 


BRIAN (acute kidney injury) Acute  


 


Arthrodesis status Acute  


 


Diabetes Acute  


 


HTN (hypertension) Acute  


 


Lumbago due to displacement of intervertebral disc Acute  


 


Lumbar stenosis Acute  


 


Pain Acute

## 2018-06-28 NOTE — NEUSURGPN
Assessment/Plan: 





Assessment:


53 male  s/p Redo Anterior interbody fusion on 6/22 with Gen surg assistance 

and sp L2-S2 fusion with L2/3 TLIF on 6/23








Plan:


Diet per Gen surg


DVT prophx: TEDs, SCDs, Lovenox


LSO when OOB


Optimize pain management


PT/OT


Please notify NS with any change in neuro/motor exam


Subjective: 





Denies any new leg pain, numbness or tingling. 


 


Objective: 


NAD A&Ox3 MAEX4 5/5 and equal in BUE and BLE. Incisions c/d/i., +LT


Catheter Insertion Date: 06/22/18





Neurosurgery Physical Exam





- Vitals, I&O, Labs





 I and O











 06/27/18 06/28/18 06/29/18





 05:59 05:59 05:59


 


Intake Total 2100 2450 


 


Output Total 840  


 


Balance 1260 2450 


 


Intake:   


 


  Oral (ml) 2100 2450 


 


Output:   


 


  Urine (ml) 800  


 


    Catheter 800  


 


  BRANDON Drain Output (ml) 40  


 


    Right Posterior Back 40  





    Shaquille Harry   


 


Other:   


 


  Number of Voids   


 


    Toilet 4 1 








 Microbiology











 06/22/18 10:53 Gram Stain - Final





 Back - Other 


 


 06/22/18 10:16 Gram Stain - Final





 Other - Other 








 Vital Signs











Temp Pulse Resp BP Pulse Ox


 


 36.6 C   89   17   128/63 H  94 


 


 06/27/18 23:45  06/27/18 23:45  06/27/18 23:45  06/27/18 23:45  06/27/18 23:45








 Laboratory Results





 06/27/18 07:40 





 06/27/18 07:40 











ICD10 Worksheet


Patient Problems: 


 Problems











Problem Status Onset


 


BRIAN (acute kidney injury) Acute  


 


Arthrodesis status Acute  


 


Diabetes Acute  


 


HTN (hypertension) Acute  


 


Lumbago due to displacement of intervertebral disc Acute  


 


Lumbar stenosis Acute  


 


Pain Acute

## 2018-06-28 NOTE — SOAPPROG
SOAP Progress Note


Assessment/Plan: 


Assessment:





54 y/o M s/o redo anterior/posterior spinal fusion POD #2





S: Laying in bed, no complaints.  Reports passing minimal gas, but no BM.  

Denies abdominal pain. 





O: Sleepy


Kidney function improving, Cr down to 1.4, K improved as well


HR in 80s during visit


BP improved


Urine output improved


Abdomen: soft, but distended, hypoactive bowel sounds, mild shadowing on 

dressing





Plan: Continue clear liquid diet.  1u prbc per neurosurg.





06/25/18 08:54





06/27/18 08:40


Tolerating clears.  Passing gas.  Abdominal dressing changed today.  Incision 

is cdi.  Advance to light diet.  


06/28/18 16:37





Tolerating light diet.  Passing gas, but denies passing BM yet.  Abdomen 

remains soft, nontender.  Normoactive BS.  Will add daily miralax for 

constipation.  Advance to regular diet. 


Objective: 





 Vital Signs











Temp Pulse Resp BP Pulse Ox


 


 36.8 C   98   16   150/70 H  95 


 


 06/28/18 14:43  06/28/18 14:43  06/28/18 14:43  06/28/18 14:43  06/28/18 14:43








 Microbiology











 06/22/18 10:53 Gram Stain - Final





 Back - Other 


 


 06/22/18 10:16 Gram Stain - Final





 Other - Other 








 Laboratory Results





 06/27/18 07:40 





 06/27/18 07:40 





 











 06/27/18 06/28/18 06/29/18





 05:59 05:59 05:59


 


Intake Total 2100 2450 450


 


Output Total 840  


 


Balance 1260 2450 450








 











PT  13.8 SEC (12.0-15.0)   06/22/18  16:35    


 


INR  1.04  (0.83-1.16)   06/22/18  16:35    














ICD10 Worksheet


Patient Problems: 


 Problems











Problem Status Onset


 


BRIAN (acute kidney injury) Acute  


 


Arthrodesis status Acute  


 


Diabetes Acute  


 


HTN (hypertension) Acute  


 


Lumbago due to displacement of intervertebral disc Acute  


 


Lumbar stenosis Acute  


 


Pain Acute

## 2018-06-29 NOTE — PDIAF
- Diagnosis


Code Status: Full Code





- Medication Management


Discharge Medications: 


 Medications to Continue on Transfer





Ascorbic Acid [Vitamin C 500 mg (*)] 1,000 mg PO DAILY@0700 09/17/15 [Last 

Taken 06/08/18]


Benztropine Mesylate [Cogentin] 0.5 mg PO DAILY 09/17/15 [Last Taken 06/22/18]


Metoprolol Tartrate [Lopressor 100 mg (*)] 100 mg PO BID@07,18 09/17/15 [Last 

Taken 06/21/18]


amLODIPine BESYLATE [Norvasc 10 mg (*)] 10 mg PO DAILY@07 09/17/15 [Last Taken 

06/22/18]


clonazePAM [Klonopin (*)] 0.5 mg PO TID@0700,1400,1800 09/17/15 [Last Taken 06/ 22/18]


Amantadine HCl [Symmetrel] 100 mg PO BID 02/17/17 [Last Taken 06/21/18]


Atorvastatin Calcium [Lipitor 20 mg (*)] 20 mg PO HS 02/17/17 [Last Taken 06/21/ 18]


Dextroamphetamine/Amphetamine [Adderall 30 mg Tablet] 30 mg PO BID@07,12 02/17/ 17 [Last Taken 06/21/18]


Divalproex Sodium [Depakote] 1,000 mg PO DAILY@16 02/17/17 [Last Taken 06/21/18]


Lisinopril [Zestril 40 mg (*)] 40 mg PO DAILY 02/17/17 [Last Taken 06/21/18]


Melatonin 5 mg PO HS 02/17/17 [Last Taken 06/08/18]


Ziprasidone HCl [Geodon] 80 mg PO TID 02/17/17 [Last Taken 06/22/18]


metFORMIN HCL [Glucophage 1000 mg] 1,000 mg PO BIDMEAL 02/17/17 [Last Taken 06/ 20/18]


Doxazosin Mesylate [Cardura 4 MG (*)] 8 mg PO DAILY 04/27/18 [Last Taken 06/22/ 18]


Ipratropium 0.06% Nasal 1 spray EACHNARE TID 04/27/18 [Last Taken 06/22/18]


Vraylar 4.5 mg PO DAILY@21 04/27/18 [Last Taken 06/21/18]


Acetaminophen [Tylenol  mg (*)] 1,000 mg PO BID@07,15 06/04/18 [Last 

Taken 06/22/18]


C/E/Zn/Cu/OM3/DHA/EPA/LUT/ZEAX [Preservision Areds 2 Softgel] 1 each PO BID 06/ 04/18 [Last Taken 06/08/18]


Cholecalciferol Vit D3 [Vitamin D3 (*)] 2,000 units PO DAILY 06/04/18 [Last 

Taken 06/08/18]


Divalproex [Depakote] 1,500 mg PO DAILY@08 06/04/18 [Last Taken 06/22/18]


Furosemide [Lasix 40 MG (*)] 40 mg PO DAILY PRN 06/04/18 [Last Taken 06/08/18]


Levothyroxine [Synthroid 50 mcg (*)] 50 mcg PO DAILY06 06/04/18 [Last Taken 06/ 22/18]


Multivitamins [Multivitamin (*)] 1 each PO DAILY 06/04/18 [Last Taken 06/08/18]


Omega-3 Fatty Acids [Fish Oil 1000 mg (*)] 1,000 mg PO BID 06/04/18 [Last Taken 

06/08/18]


Zolpidem Tartrate [Ambien] 10 mg PO HS 06/04/18 [Last Taken 06/21/18]


tiZANidine HCL [Zanaflex] 4 mg PO BID@07,15 06/04/18 [Last Taken 06/22/18]


Gabapentin [Neurontin 300 MG (*)] 600 mg PO TID@0700,1400,2100 #90 cap 06/29/18 

[Last Taken Unknown]


Methocarbamol [Robaxin 750 mg (*)] 750 mg PO QID PRN #60 tab 06/29/18 [Last 

Taken Unknown]


Sennosides/Docusate Sodium [Senokot-S] 1 - 2 tab PO BID  tab 06/29/18 [Last 

Taken Unknown]


oxyCODONE IR [Oxycodone Ir (*)] 5 - 10 mg PO Q4HRS PRN #90 tab 06/29/18 [Last 

Taken Unknown]








Discharge Medications: Refer to the Discharge Home Medication list for PRN 

reason.


PICC Care - Routine: N/A





- Orders


Services needed: Registered Nurse, Physical Therapy, Occupational Therapy


Isolation Type: None


Diet Recommendation: no restrictions on diet


Diet Texture: Regular Texture Diet


Wound Care Instructions: No dressing needed on posterior incision.  Apply heat 

with K-pad or other heating pad to his right forearm 4 times daily including 

the area of abrasions distal to the antecubital.  Apply antibacterial ointment 

to this area of abrasions until redness this resolved around them


Sutures/Staples Site: Will remove in approx 14 days post op


Activity/Weight Bearing Restrictions: No bendig or twisting.  Do not lift 

greater than 10 pounds.  Wear brace when out of bed


Equipment: Brace on when out of bed, not needed for shower 


Additional Instructions: 


No bending or twisting


Do not lift greater than 10 pounds


Wear brace when out of bed


Ok to shower 





** NOTE REGARDING HIS ELIQUIS FOR ANTICOAGULATION


This is to treat DVT in the right upper extremity diagnosed on June 29. He is 

starting 10 mg of Eliquis twice daily June 29 but should change to 5 mg twice 

daily on July 6 and the duration of treatment should be 3 months unless change 

in his condition dictates otherwise 





- Follow Up Care


Current Providers and Referrals: 


Kannan Car MD [Primary Care Provider] - 


Blanco Alonso MD [Medical Doctor] - follow up in 2 weeks


Jai Gonzalez MD [Medical Doctor] - follow up in 2 weeks

## 2018-06-29 NOTE — ASDISCHSUM
----------------------------------------------

Discharge Information

----------------------------------------------

Plan Status:SNF                                      Medically Cleared to Leave:

Discharge Date:06/29/2018 03:07 PM                    D/C Disposition:Skilled Nursing Facility

ADT D/C Disposition:Other Rehab, Not [place]        Projected Discharge Date:06/29/2018 11:00 AM

Transportation at D/C:Wheelchair Van                 Discharge Delay Reason:

Follow-Up Date:06/29/2018 11:00 AM                   Discharge Slot:

Final Diagnosis:Lumbar redo

----------------------------------------------

Placement Information

----------------------------------------------

Referral Type:*Nursing Home/SNF                      Referral ID:[id number]

Provider Name:[facility]

Address 1:[address]                    Phone [phone]

Address 2:                                           Fax [phone]

City:[place]                                      Selection Factors:

State:[place]

 

----------------------------------------------

Patient Contact Information

----------------------------------------------

Contact Name:NITIN                       Relationship:Daughter

Address:                                             Home [phone]

                                                     Work Phone:

City:                                                [facility] Phone:

State/Zip Code:                                      Email:

----------------------------------------------

Financial Information

----------------------------------------------

Financial Class:Medicare

Primary Plan Desc:MEDICARE INPATIENT                 Primary Plan Number:[id number]

[facility]              Secondary Plan Number:[id number]

 

 

----------------------------------------------

Assessment Information

----------------------------------------------

----------------------------------------------

LACE

----------------------------------------------

LACE

 

Length of stay for            Answers:  7-13 days                             

current admission                                                             

Acuity / Level of             Answers:  Yes                                   

Care: Did the patient                                                         

have an inpatient                                                             

admission?                                                                    

Comorbidities - select        Answers:  Other                         Notes:  spinal surgery

all that apply                                                                

# of Emergency department     Answers:  0                                     

visits in the last 6                                                          

months                                                                        

Score: 9

 

Date Signed:  06/29/2018 03:07 PM

Electronically Signed By:LUIS Anton

 

 

----------------------------------------------

[facility] CM Progress Note

----------------------------------------------

CM Note

 

CM Note                       

Notes:

Pt admitted w/progressive lower back pain and LE radiculopathy. He is post-op day 1, had fusion and 


T-Lif w/ some hardware removal. Pt has not yet worked w/therapies, dc needs tbd. 

 

Date Signed:  06/23/2018 05:51 PM

Electronically Signed By:Malorie Bardales RN

 

 

----------------------------------------------

[facility] CM Progress Note

----------------------------------------------

CM Note

 

CM Note                       

Notes:

Therapies recommending SNF Rehab. Spoke to patient and mother. He has been at [facility] in the past 


and would like to return there for rehab. Referral sent. [facility] contacted.

 

Date Signed:  06/26/2018 04:20 PM

Electronically Signed By:Camille Up LCSW

 

 

----------------------------------------------

[facility] CM Progress Note

----------------------------------------------

CM Note

 

CM Note                       

Notes:

Pt medically stable for d/c to [facility]. Orders sent in [employer]. VIN Gibson called 

report. July scheduled  van pickup for 1500. Updated d/c meds and Hospitalist transfer of care 

sent in [employer]. 

 

Date Signed:  06/29/2018 03:12 PM

Electronically Signed By:LUIS Anton

 

 

----------------------------------------------

Intervention Information

----------------------------------------------

Intervention Type:*IM-Signed                         Date of Service:06/29/2018 10:19 AM

Patient Type:Inpatient                               Staff Member:Kylie García

Hours:                                               Discipline:

Severity:                                            Comment:

## 2018-06-29 NOTE — PDIAF
- Diagnosis


Code Status: Full Code





- Medication Management


Discharge Medications: 


 Medications to Continue on Transfer





Ascorbic Acid [Vitamin C 500 mg (*)] 1,000 mg PO DAILY@0700 09/17/15 [Last 

Taken 06/08/18]


Benztropine Mesylate [Cogentin] 0.5 mg PO DAILY 09/17/15 [Last Taken 06/22/18]


Metoprolol Tartrate [Lopressor 100 mg (*)] 100 mg PO BID@07,18 09/17/15 [Last 

Taken 06/21/18]


amLODIPine BESYLATE [Norvasc 10 mg (*)] 10 mg PO DAILY@07 09/17/15 [Last Taken 

06/22/18]


clonazePAM [Klonopin (*)] 0.5 mg PO TID@0700,1400,1800 09/17/15 [Last Taken 06/ 22/18]


Amantadine HCl [Symmetrel] 100 mg PO BID 02/17/17 [Last Taken 06/21/18]


Atorvastatin Calcium [Lipitor 20 mg (*)] 20 mg PO HS 02/17/17 [Last Taken 06/21/ 18]


Dextroamphetamine/Amphetamine [Adderall 30 mg Tablet] 30 mg PO BID@07,12 02/17/ 17 [Last Taken 06/21/18]


Divalproex Sodium [Depakote] 1,000 mg PO DAILY@16 02/17/17 [Last Taken 06/21/18]


Lisinopril [Zestril 40 mg (*)] 40 mg PO DAILY 02/17/17 [Last Taken 06/21/18]


Melatonin 5 mg PO HS 02/17/17 [Last Taken 06/08/18]


Ziprasidone HCl [Geodon] 80 mg PO TID 02/17/17 [Last Taken 06/22/18]


metFORMIN HCL [Glucophage 1000 mg] 1,000 mg PO BIDMEAL 02/17/17 [Last Taken 06/ 20/18]


Doxazosin Mesylate [Cardura 4 MG (*)] 8 mg PO DAILY 04/27/18 [Last Taken 06/22/ 18]


Ipratropium 0.06% Nasal 1 spray EACHNARE TID 04/27/18 [Last Taken 06/22/18]


Vraylar 4.5 mg PO DAILY@21 04/27/18 [Last Taken 06/21/18]


Acetaminophen [Tylenol  mg (*)] 1,000 mg PO BID@07,15 06/04/18 [Last 

Taken 06/22/18]


C/E/Zn/Cu/OM3/DHA/EPA/LUT/ZEAX [Preservision Areds 2 Softgel] 1 each PO BID 06/ 04/18 [Last Taken 06/08/18]


Cholecalciferol Vit D3 [Vitamin D3 (*)] 2,000 units PO DAILY 06/04/18 [Last 

Taken 06/08/18]


Divalproex [Depakote] 1,500 mg PO DAILY@08 06/04/18 [Last Taken 06/22/18]


Furosemide [Lasix 40 MG (*)] 40 mg PO DAILY PRN 06/04/18 [Last Taken 06/08/18]


Levothyroxine [Synthroid 50 mcg (*)] 50 mcg PO DAILY06 06/04/18 [Last Taken 06/ 22/18]


Multivitamins [Multivitamin (*)] 1 each PO DAILY 06/04/18 [Last Taken 06/08/18]


Omega-3 Fatty Acids [Fish Oil 1000 mg (*)] 1,000 mg PO BID 06/04/18 [Last Taken 

06/08/18]


Zolpidem Tartrate [Ambien] 10 mg PO HS 06/04/18 [Last Taken 06/21/18]


tiZANidine HCL [Zanaflex] 4 mg PO BID@07,15 06/04/18 [Last Taken 06/22/18]


Gabapentin [Neurontin 300 MG (*)] 600 mg PO TID@0700,1400,2100 #90 cap 06/29/18 

[Last Taken Unknown]


Methocarbamol [Robaxin 750 mg (*)] 750 mg PO QID PRN #60 tab 06/29/18 [Last 

Taken Unknown]


Sennosides/Docusate Sodium [Senokot-S] 1 - 2 tab PO BID  tab 06/29/18 [Last 

Taken Unknown]


oxyCODONE IR [Oxycodone Ir (*)] 5 - 10 mg PO Q4HRS PRN #90 tab 06/29/18 [Last 

Taken Unknown]








Discharge Medications: Refer to the Discharge Home Medication list for PRN 

reason.


PICC Care - Routine: N/A





- Orders


Services needed: Registered Nurse, Physical Therapy, Occupational Therapy


Isolation Type: None


Diet Recommendation: no restrictions on diet


Diet Texture: Regular Texture Diet


Wound Care Instructions: No dressing needed on posterior incision


Sutures/Staples Site: Will remove in approx 14 days post op


Activity/Weight Bearing Restrictions: No bendig or twisting.  Do not lift 

greater than 10 pounds.  Wear brace when out of bed


Equipment: Brace on when out of bed, not needed for shower 


Additional Instructions: 


No bending or twisting


Do not lift greater than 10 pounds


Wear brace when out of bed


Ok to shower 








- Follow Up Care


Current Providers and Referrals: 


Kannan Car MD [Primary Care Provider] - 


Jai Gonzalez MD [Medical Doctor] - follow up in 2 weeks


Blanco Alonso MD [Medical Doctor] - follow up in 2 weeks

## 2018-06-29 NOTE — ASMTLACE
LACE

 

Length of stay for            Answers:  7-13 days                             

current admission                                                             

Acuity / Level of             Answers:  Yes                                   

Care: Did the patient                                                         

have an inpatient                                                             

admission?                                                                    

Comorbidities - select        Answers:  Other                         Notes:  spinal surgery

all that apply                                                                

# of Emergency department     Answers:  0                                     

visits in the last 6                                                          

months                                                                        

Score: 9

 

Date Signed:  06/29/2018 03:07 PM

Electronically Signed By:LUIS Anton

## 2018-06-29 NOTE — ASMTCMCOM
CM Note

 

CM Note                       

Notes:

Pt medically stable for d/c to [facility]. Orders sent in [employer]scripts. VIN Gibson called 

report. July scheduled  van pickup for 1500. Updated d/c meds and Hospitalist transfer of care 

sent in [employer]scripts. 

 

Date Signed:  06/29/2018 03:12 PM

Electronically Signed By:LUIS Anton

## 2018-06-29 NOTE — NEUSURGPN
Date of Surgery: 06/23/18


Post Op Day: 6


Assessment/Plan: 


Assessment:


53 male  s/p Redo Anterior interbody fusion on 6/22 with Gen surg assistance 

and sp L2-S2 fusion with L2/3 TLIF on6/23





Plan:


RUE swelling/weeping from probably IV site dorsal right hand. No redness. Will 

get RUE US to eval for DVT 


If US negative, ok to discharge to rehab today 


Diet per Gen surg


DVT prophx: TEDs, SCDs, Lovenox


LSO when OOB


PT/OT


Patient discussed with Dr Gonzalez 


Please notify NS with any questions/concerns 








Subjective: 


Doing well, having BM, pain controlled 


Objective: 


AxO x3


PERRLA


BLE 5/5 


Sensation intact to light touch BLE


Incision CDI with dermabond 


Neuro Check Frequency: per routine 


Urinary Catheter in Place: No


Catheter Insertion Date: 06/22/18





- Physician


Discussed Patient with : Carlos





Neurosurgery Physical Exam





- Vitals, I&O, Labs





 I and O











 06/28/18 06/29/18 06/30/18





 05:59 05:59 05:59


 


Intake Total 2450 1100 


 


Balance 2450 1100 


 


Intake:   


 


  Oral (ml) 2450 1100 


 


Other:   


 


  Number of Voids   


 


    Toilet 1 2 


 


  Number of Stools   


 


    Toilet  3 








 Microbiology











 06/22/18 10:53 Gram Stain - Final





 Back - Other 


 


 06/22/18 10:16 Gram Stain - Final





 Other - Other 








 Vital Signs











Temp Pulse Resp BP Pulse Ox


 


 36.4 C   80   16   126/74 H  92 


 


 06/29/18 07:16  06/29/18 07:16  06/29/18 07:16  06/29/18 08:10  06/29/18 07:16








 Laboratory Results





 06/27/18 07:40 





 06/27/18 07:40 











ICD10 Worksheet


Patient Problems: 


 Problems











Problem Status Onset


 


BRIAN (acute kidney injury) Acute  


 


Arthrodesis status Acute  


 


Diabetes Acute  


 


HTN (hypertension) Acute  


 


Lumbago due to displacement of intervertebral disc Acute  


 


Lumbar stenosis Acute  


 


Pain Acute

## 2018-06-29 NOTE — PDIAF
- Diagnosis


Code Status: Full Code





- Medication Management


Discharge Medications: 


 Medications to Continue on Transfer





Ascorbic Acid [Vitamin C 500 mg (*)] 1,000 mg PO DAILY@0700 09/17/15 [Last 

Taken 06/08/18]


Benztropine Mesylate [Cogentin] 0.5 mg PO DAILY 09/17/15 [Last Taken 06/22/18]


Metoprolol Tartrate [Lopressor 100 mg (*)] 100 mg PO BID@07,18 09/17/15 [Last 

Taken 06/21/18]


amLODIPine BESYLATE [Norvasc 10 mg (*)] 10 mg PO DAILY@07 09/17/15 [Last Taken 

06/22/18]


clonazePAM [Klonopin (*)] 0.5 mg PO TID@0700,1400,1800 09/17/15 [Last Taken 06/ 22/18]


Amantadine HCl [Symmetrel] 100 mg PO BID 02/17/17 [Last Taken 06/21/18]


Atorvastatin Calcium [Lipitor 20 mg (*)] 20 mg PO HS 02/17/17 [Last Taken 06/21/ 18]


Dextroamphetamine/Amphetamine [Adderall 30 mg Tablet] 30 mg PO BID@07,12 02/17/ 17 [Last Taken 06/21/18]


Divalproex Sodium [Depakote] 1,000 mg PO DAILY@16 02/17/17 [Last Taken 06/21/18]


Lisinopril [Zestril 40 mg (*)] 40 mg PO DAILY 02/17/17 [Last Taken 06/21/18]


Melatonin 5 mg PO HS 02/17/17 [Last Taken 06/08/18]


Ziprasidone HCl [Geodon] 80 mg PO TID 02/17/17 [Last Taken 06/22/18]


metFORMIN HCL [Glucophage 1000 mg] 1,000 mg PO BIDMEAL 02/17/17 [Last Taken 06/ 20/18]


Doxazosin Mesylate [Cardura 4 MG (*)] 8 mg PO DAILY 04/27/18 [Last Taken 06/22/ 18]


Ipratropium 0.06% Nasal 1 spray EACHNARE TID 04/27/18 [Last Taken 06/22/18]


Vraylar 4.5 mg PO DAILY@21 04/27/18 [Last Taken 06/21/18]


Acetaminophen [Tylenol  mg (*)] 1,000 mg PO BID@07,15 06/04/18 [Last 

Taken 06/22/18]


C/E/Zn/Cu/OM3/DHA/EPA/LUT/ZEAX [Preservision Areds 2 Softgel] 1 each PO BID 06/ 04/18 [Last Taken 06/08/18]


Cholecalciferol Vit D3 [Vitamin D3 (*)] 2,000 units PO DAILY 06/04/18 [Last 

Taken 06/08/18]


Divalproex [Depakote] 1,500 mg PO DAILY@08 06/04/18 [Last Taken 06/22/18]


Furosemide [Lasix 40 MG (*)] 40 mg PO DAILY PRN 06/04/18 [Last Taken 06/08/18]


Levothyroxine [Synthroid 50 mcg (*)] 50 mcg PO DAILY06 06/04/18 [Last Taken 06/ 22/18]


Multivitamins [Multivitamin (*)] 1 each PO DAILY 06/04/18 [Last Taken 06/08/18]


Omega-3 Fatty Acids [Fish Oil 1000 mg (*)] 1,000 mg PO BID 06/04/18 [Last Taken 

06/08/18]


Zolpidem Tartrate [Ambien] 10 mg PO HS 06/04/18 [Last Taken 06/21/18]


tiZANidine HCL [Zanaflex] 4 mg PO BID@07,15 06/04/18 [Last Taken 06/22/18]


Gabapentin [Neurontin 300 MG (*)] 600 mg PO TID@0700,1400,2100 #90 cap 06/29/18 

[Last Taken Unknown]


Methocarbamol [Robaxin 750 mg (*)] 750 mg PO QID PRN #60 tab 06/29/18 [Last 

Taken Unknown]


Sennosides/Docusate Sodium [Senokot-S] 1 - 2 tab PO BID  tab 06/29/18 [Last 

Taken Unknown]


oxyCODONE IR [Oxycodone Ir (*)] 5 - 10 mg PO Q4HRS PRN #90 tab 06/29/18 [Last 

Taken Unknown]








Discharge Medications: Refer to the Discharge Home Medication list for PRN 

reason.


PICC Care - Routine: N/A





- Orders


Services needed: Registered Nurse, Physical Therapy, Occupational Therapy


Isolation Type: None


Diet Recommendation: no restrictions on diet


Diet Texture: Regular Texture Diet


Wound Care Instructions: No dressing needed on posterior incision.  Apply heat 

with K-pad or other heating pad to his right forearm 4 times daily including 

the area of abrasions distal to the antecubital.  Apply antibacterial ointment 

to this area of abrasions until redness this resolved around them


Sutures/Staples Site: Will remove in approx 14 days post op


Activity/Weight Bearing Restrictions: No bendig or twisting.  Do not lift 

greater than 10 pounds.  Wear brace when out of bed.  ** pt has a rotator cuff 

injury R shoulder needs icing and physical therapy management for this


Equipment: Brace on when out of bed, not needed for shower 


Additional Instructions: 


No bending or twisting


Do not lift greater than 10 pounds


Wear brace when out of bed


Ok to shower 





** NOTE REGARDING HIS ELIQUIS FOR ANTICOAGULATION


This is to treat DVT in the right upper extremity diagnosed on June 29. He is 

starting 10 mg of Eliquis twice daily June 29 but should change to 5 mg twice 

daily on July 6 and the duration of treatment should be 3 months unless change 

in his condition dictates otherwise 





- Follow Up Care


Current Providers and Referrals: 


Kannan Car MD [Primary Care Provider] - 


Blanco Alonso MD [Medical Doctor] - follow up in 2 weeks


Jai Gonzalez MD [Medical Doctor] - follow up in 2 weeks

## 2018-06-29 NOTE — PDGENHP
History and Physical


History and Physical: 





CC:  I am asked by Dr. Gonzalez of Neurosurgery to assess and assist in the care 

of this patient who is diagnosed with DVT postoperatively





HISTORY:


This patient was admitted electively on June 22nd for spine surgery.  This is a 

redo surgery and he underwent on June 22nd and anterior interbody fusion and on 

June 23rd a posterior approaches the with further effusions stabilization of 

lumbar spine.  The surgery went well and overall other than some bleeding and 

requiring transfusion the patient has had a fairly uncomplicated course.  He is 

felt stable for discharge to the from the hospital today with transfer to a 

skilled nursing facility as his ability for ambulation is still limited due to 

pain.





I am asked to see the patient today because of a diagnosis of DVT in the right 

upper extremity.  The patient tells me he has had swelling in the right forearm 

and hand for few days without any significant pain there.  He is not having any 

trouble using his hand.  He also does note some pain focally in the right 

shoulder with certain activities with that shoulder.  Notably he does have a 

history of prior prosthesis placement at the shoulder about a year ago.  He is 

not recall any particular injury at this time but tells me he avulsed his 

biceps on that side not long after the surgery a year ago.





An ultrasound of the right upper extremity was done today showing thrombus in 

the basilic vein with no abnormalities in the other veins of the arm.  Notably 

the patient has been receiving Lovenox subcutaneously daily for DVT prophylaxis 

here.  He did not have a PICC line or other central line or procedures on the 

right arm.  Also notable the patient does not have any chest discomfort 

shortness of breath or hypoxemia or tachycardia.








ROS:  A comprehensive 10 system review revealed no other significant findings








PAST MEDICAL HISTORY:


Lumbar spine disease with prior surgery


Sleep apnea using CPAP


Diabetes mellitus


Obesity


Bipolar disorder on medications


Hypertension


Hyperlipidemia


Anxiety disorder


Congenital kidney disease





FAMILY MEDICAL HISTORY:








SOCIAL HISTORY:


No tobacco or street drugs or alcohol











PHYSICAL EXAMINATION:


Vital Signs:  At this time all stable without fever





Examination:


General: alert, oriented, good mentation, relaxed


Skin: warm, dry, good color, no rash


HEENT: normal


Neck: no mass or jvd


Resps: relaxed


Lungs: clear breath sounds


Heart: regular, no murmur


Abdomen: soft, nondistended, nontender, +BS, no mass


Upper Extremities:  The right arm is remarkable for significant edema from just 

above the elbow down to the fingertips without change in skin temperature or 

color overall.  There is 1 area just distal to the antecubital fossa whether a 

few small abrasions from medical tape - these are all at this time closed with 

a dried here it serous appearing material, but there is a slight cellulitic 

appearance to the area of skin between these lesions.  None of them have any 

fluctuance or evidence of necrosis.  His right shoulder has good range of 

motion in flexion abduction abduction however there is pain with abduction and 

flexion verses resistance with no visible or palpable abnormalities at joint 

otherwise.  


Lower Extremities: no edema, warm


No Bleeding or bruising


Neurologic: normal speech/language, normal CNs, no focal weakness


IV site: looks normal








LABORATORY DATA:


He has had a stable anemia in the last few days with hemoglobin of 8 otherwise 

unremarkable CBC


His fingersticks have shown sugars between 130 and 178 over the last 3 days





RADIOLOGY STUDIES:


Doppler ultrasound is done today showing thrombus in the basilic vein; I have 

reviewed the images and do agree that the findings are consistent with venous 

thrombosis


I reviewed chest x-ray from a couple days ago which does show his right 

shoulder prosthesis with good positioning of the prosthesis and glenohumeral 

fossa and no evidence of loosening of the component in the proximal humerus or 

other skeletal abnormalities there





ASSESSMENT:


* upper extremity DVT the right arm in the postoperative setting after spine 

surgery despite use of daily Lovenox; no evidence of PE


* probable right shoulder subacromial bursitis verses rotator cuff injury 

during hospitalization, reasonably good mobility of shoulder at this time but 

will warrant physical therapy


* small area of very mild superficial cellulitis and the proximal right forearm 

just distal to the antecubital fossa associated with some skin lesions caused 

by medical tape


* diabetes mellitus type 2 with good control sugars at this time


* mental health disorders or currently well controlled his current medications


* status post anterior-posterior approach with redo lumbar spine fusion and 

stabilization, otherwise doing well after surgery complicated only by some 

postoperative anemia aside from the above





Notably at this time he does not show any signs of bleeding and I do not feel 

he has significant bleeding risk with full-dose anticoagulation





PLANS:


-I agree that he can go to rehab today as previously planned


-I will start him on some Eliquis at 10 mg twice daily for 1 week followed by 5 

mg twice daily; this treatments gone for 3 months


-he will need physical therapy for his shoulder and ice it 3 times a day


-I recommended applying heat to his right forearm on the flexor aspect as well 

as some antibiotic ointment and recommend this be watch carefully by the nurses

## 2018-07-31 NOTE — GDS
[f rep st]



                                                             DISCHARGE SUMMARY





ADMISSION DIAGNOSES:  L5-S1 pseudoarthrosis with broken hardware, bilateral S1 and loose hardware at 
L5, history of prior spinal fusion x2 with posterior anterior lumbar fusion at L5-S1, progressive low
 back pain, progressive lower extremity radiculopathy, and treatment refractory to nonoperative inter
vention.



PROCEDURES:  Stage I on 06/22/2018, anterior arthrodesis with approach to L5-S1 with exploration of p
rior L5-S1 hardware and subsequent removal of L5-S1 interbody cage with a partial L5 and partial S1 c
orpectomy and redo interbody fusion. 



On 06/23/2018, stage II posterior arthrodesis with approach to L2, L3, L4, L5, S1, S2 with exploratio
n of prior lumbar fusion from L3-S1 with a posterior lateral fusion from L2-S2, augmentation of the l
eft S1 screw with bone cement, a decompressive laminectomy and bilateral medial facetectomy at L2-3 w
ith an L2-3 transforaminal lumbar interbody fusion.



HOSPITAL COURSE/HISTORY/MAJOR MEDICAL FINDINGS:  The patient is a 53-year-old gentleman who had prese
nted to Dr. Gonzalez's outpatient clinic with low back pain.  He has a history of prior jail and lumbar 
revision surgery and pseudoarthrosis done by Dr. Gonzalez; however, with his worsening back pain, he fu
rther underwent imaging, which demonstrated broken hardware and pseudoarthrosis at L5-S1 with adjacen
t level breakdown at L2-3.  The risks, benefits and alternatives to proceeding with the above-named p
rocedures were thoroughly discussed with the patient.  



He was taken to the operating room on 06/22/2018, for a stage I procedure; however, given the length 
and complexity of the anterior procedure that was required for stage I, the surgery was stopped after
 completion of that stage and stage II with postponed to the following day to allow the patient to re
cover.  Once the second-stage was completed, the patient was extubated in the operating room and duque
sferred to the PACU.  Once awake and alert in PACU, he was transferred over to the ICU just for close
 monitoring and postoperative pain management.  His diet was advanced, and he was followed by General
 Surgery during this time.  His pain management was optimized.  He was seen by PT, OT, and his postop
erative drains were removed without complication.  He was deemed fit for discharge to home with home 
health care.  He was diagnosed with a DVT in his right upper extremity while in-house and was started
 on Eliquis while in-house.



HOME MEDICATIONS:  Include vitamin C, __________, metoprolol, amlodipine, clonazepam, amantadine, Lip
itor, Adderall, Depakote, Zestril, melatonin, Geodon, Glucophage, Cardura, Tylenol, multivitamin, Dep
akote, Lasix, Synthroid, omega-3 fatty acids, Neurontin, Robaxin, oxycodone 5-10 mg q.4 hours, Senoko
t, as well as Eliquis.



DISCHARGE INSTRUCTIONS:  

1.  The patient is to wear his lumbar corset whenever he is up and out of bed.

2.  He is to avoid all bending, twisting, lifting anything greater than 5 pounds.

3.  He is not to drive while taking narcotic pain medications.

4.  The patient will follow up with primary care doctor regarding the DVT and maintaining on the Eliq
uis.

5.  If the patient develops any redness, fever, discharge, pain that is not relieved by our pain medi
cations, he is encouraged to give the [facility] office a call.

6.  If the patient develops any life-threatening emergent conditions, such as chest pain, signs or sy
mptoms of stroke, or gross motor loss in bilateral extremities, he is encouraged to go to his local e
mergency room.



FOLLOWUP CARE:  Patient is to follow up with Dr. Gonzalez or one of his PAs in approximately 2-3 weeks.
  If he has any questions or concerns prior to that time, he is encouraged to give our office a call.






Job #:  [id number]

## 2018-09-21 NOTE — EDPHY
H & P


Time Seen by Provider: 09/21/18 20:00


HPI/ROS: 





CHIEF COMPLAINT:  Flank pain





HISTORY OF PRESENT ILLNESS:  53-year-old male with a history of kidney stones 

and chronic back pain here with left-sided flank pain starting yesterday 

evening.  Denies any fever chills or vomiting or diarrhea.  He does state that 

he feels constipated and has not had a bowel movement approximately 3 days.  He 

is passing gas today.  No history of bowel obstruction.  He takes 5 mg of 

oxycodone every 4 hr as needed for chronic low back pain.  He did have lumbar 

spinal fusion and June of this year.  Denies any fever chills, incontinence, 

saddle paresthesias, radiation of his pain to his legs.





REVIEW OF SYSTEMS:


Constitutional:  No fever, no chills.


Eyes:  No discharge.


ENT:  No sore throat.


Cardiovascular:  No chest pain, no palpitations.


Respiratory:  No cough, no shortness of breath.


Gastrointestinal:  No abdominal pain, no vomiting.


Genitourinary:  No hematuria.


Musculoskeletal:  + back pain.


Skin:  No rashes.


Neurological:  No headache.


Smoking Status: Former smoker


Physical Exam: 





General Appearance:  Alert and no distress.


Eyes:  Pupils equal and round no injection.


Respiratory:  Chest is nontender, lungs are clear to auscultation.


Cardiac:  regular rate and rhythm.


Gastrointestinal:  Abdomen is soft and mildly tender to the left upper left 

lower quadrant,  no masses, bowel sounds normal.


Musculoskeletal:  Neck is supple and nontender.


Extremities have full range of motion and are nontender.


Skin:  No rashes or lesions.





Constitutional: 


 Initial Vital Signs











Temperature (C)  37 C   09/21/18 20:03


 


Heart Rate  77   09/21/18 20:03


 


Respiratory Rate  20   09/21/18 20:03


 


Blood Pressure  144/99 H  09/21/18 20:03


 


O2 Sat (%)  95   09/21/18 20:03








 











O2 Delivery Mode               Room Air














Allergies/Adverse Reactions: 


 





carbamazepine [From Tegretol] Allergy (Verified 09/21/18 20:02)


 Hives


ramelteon [From Rozerem] Allergy (Verified 09/21/18 20:02)


 Hives


vortioxetine hydrobromide [From Brintellix] Allergy (Verified 09/21/18 20:02)


 Hives


anticonvulsant Allergy (Uncoded 09/21/18 20:02)


 Hives








Home Medications: 














 Medication  Instructions  Recorded


 


Ascorbic Acid [Vitamin C 500 mg 1,000 mg PO DAILY@0700 09/17/15





(*)]  


 


Benztropine Mesylate [Cogentin] 0.5 mg PO DAILY 09/17/15


 


Metoprolol Tartrate [Lopressor 100 100 mg PO BID@07,18 09/17/15





mg (*)]  


 


amLODIPine BESYLATE [Norvasc 10 mg 10 mg PO DAILY@07 09/17/15





(*)]  


 


clonazePAM [Klonopin (*)] 0.5 mg PO TID@0700,1400,1800 09/17/15


 


Amantadine HCl [Symmetrel] 100 mg PO BID 02/17/17


 


Atorvastatin Calcium [Lipitor 20 20 mg PO HS 02/17/17





mg (*)]  


 


Dextroamphetamine/Amphetamine 30 mg PO BID@07,12 02/17/17





[Adderall 30 mg Tablet]  


 


Divalproex Sodium [Depakote] 1,000 mg PO DAILY@16 02/17/17


 


Lisinopril [Zestril 40 mg (*)] 40 mg PO DAILY 02/17/17


 


Melatonin 5 mg PO HS 02/17/17


 


Ziprasidone HCl [Geodon] 80 mg PO TID 02/17/17


 


metFORMIN HCL [Glucophage 1000 mg] 1,000 mg PO BIDMEAL 02/17/17


 


Doxazosin Mesylate [Cardura 4 MG 8 mg PO DAILY 04/27/18





(*)]  


 


Ipratropium 0.06% Nasal 1 spray EACHNARE TID 04/27/18


 


Vraylar 4.5 mg PO DAILY@21 04/27/18


 


Acetaminophen [Tylenol  mg 1,000 mg PO BID@07,15 06/04/18





(*)]  


 


C/E/Zn/Cu/OM3/DHA/EPA/LUT/ZEAX 1 each PO BID 06/04/18





[Preservision Areds 2 Softgel]  


 


Cholecalciferol Vit D3 [Vitamin D3 2,000 units PO DAILY 06/04/18





(*)]  


 


Divalproex [Depakote] 1,500 mg PO DAILY@08 06/04/18


 


Furosemide [Lasix 40 MG (*)] 40 mg PO DAILY PRN 06/04/18


 


Levothyroxine [Synthroid 50 mcg 50 mcg PO DAILY06 06/04/18





(*)]  


 


Multivitamins [Multivitamin (*)] 1 each PO DAILY 06/04/18


 


Omega-3 Fatty Acids [Fish Oil 1000 1,000 mg PO BID 06/04/18





mg (*)]  


 


Zolpidem Tartrate [Ambien] 10 mg PO HS 06/04/18


 


tiZANidine HCL [Zanaflex] 4 mg PO BID@07,15 06/04/18


 


Apixaban [Eliquis] 5 mg PO BID #60 tab 06/29/18


 


Apixaban [Eliquis] 10 mg PO BID #14 tab 06/29/18


 


Gabapentin [Neurontin 300 MG (*)] 600 mg PO TID@0700,1400,2100 #90 06/29/18





 cap 


 


Methocarbamol [Robaxin 750 mg (*)] 750 mg PO QID PRN #60 tab 06/29/18


 


Sennosides/Docusate Sodium 1 - 2 tab PO BID  tab 06/29/18





[Senokot-S]  


 


oxyCODONE IR [Oxycodone Ir (*)] 5 - 10 mg PO Q4HRS PRN #90 tab 06/29/18














Medical Decision Making





- Diagnostics


Imaging Results: 


 Imaging Impressions





Abdomen/Pelvis CT  09/21/18 20:26


Impression:


1.  No nephrolithiasis or hydronephrosis.


2.  Constipation.


3.  A few sigmoid diverticula, without diverticulitis.


4.  No evidence of appendicitis.


5.  Lumbar fusion.


6.  Bilateral renal cortical scarring possibly from old urinary tract 

infections.


 


Findings and recommendations discussed with Emergency Department Physician 

Assistant, Fazal Estrada PA-C, at 2107 hours, on September 21, 2018.


 


Final report concurs with initial preliminary interpretation.


 


Attention:  This CT examination is specifically designed to evaluate patients 

who are clinically suspected of having acute obstructive uropathy.  This 

examination does not use radiographic contrast, and as such, provides only a 

limited evaluation of the abdomen, pelvis, and retroperitoneum.  If there is 

further clinical suspicion for pathological conditions other than obstructive 

uropathy, a complete CT evaluation of the abdomen and pelvis utilizing 

intravenous, oral, and rectal contrast should be considered.











ED Course/Re-evaluation: 





53-year-old male here with left flank pain found to have constipation by CT 

scan been there is no evidence of renal colic, UTI, pyelonephritis, cauda equina

, epidural abscess.  I discussed use stool softeners while taking chronic 

opiates.  Patient is agreeable with this plan.





- Data Points


Laboratory Results: 


 Laboratory Results





 09/21/18 20:27 





 09/21/18 20:27 





 











  09/21/18 09/21/18





  20:27 20:27


 


WBC    7.25 10^3/uL 10^3/uL





    (3.80-9.50) 


 


RBC    4.73 10^6/uL 10^6/uL





    (4.40-6.38) 


 


Hgb    14.2 g/dL g/dL





    (13.7-17.5) 


 


Hct    41.7 % %





    (40.0-51.0) 


 


MCV    88.2 fL fL





    (81.5-99.8) 


 


MCH    30.0 pg pg





    (27.9-34.1) 


 


MCHC    34.1 g/dL g/dL





    (32.4-36.7) 


 


RDW    13.4 % %





    (11.5-15.2) 


 


Plt Count    207 10^3/uL 10^3/uL





    (150-400) 


 


MPV    9.6 fL fL





    (8.7-11.7) 


 


Neut % (Auto)    48.5 % %





    (39.3-74.2) 


 


Lymph % (Auto)    33.9 % %





    (15.0-45.0) 


 


Mono % (Auto)    6.6 % %





    (4.5-13.0) 


 


Eos % (Auto)    9.9 % H %





    (0.6-7.6) 


 


Baso % (Auto)    0.7 % %





    (0.3-1.7) 


 


Nucleat RBC Rel Count    0.0 % %





    (0.0-0.2) 


 


Absolute Neuts (auto)    3.51 10^3/uL 10^3/uL





    (1.70-6.50) 


 


Absolute Lymphs (auto)    2.46 10^3/uL 10^3/uL





    (1.00-3.00) 


 


Absolute Monos (auto)    0.48 10^3/uL 10^3/uL





    (0.30-0.80) 


 


Absolute Eos (auto)    0.72 10^3/uL H 10^3/uL





    (0.03-0.40) 


 


Absolute Basos (auto)    0.05 10^3/uL 10^3/uL





    (0.02-0.10) 


 


Absolute Nucleated RBC    0.00 10^3/uL 10^3/uL





    (0-0.01) 


 


Immature Gran %    0.4 % %





    (0.0-1.1) 


 


Immature Gran #    0.03 10^3/uL 10^3/uL





    (0.00-0.10) 


 


Sodium  135 mEq/L mEq/L  





   (135-145)  


 


Potassium  4.5 mEq/L mEq/L  





   (3.3-5.0)  


 


Chloride  98 mEq/L mEq/L  





   ()  


 


Carbon Dioxide  27 mEq/l mEq/l  





   (22-31)  


 


Anion Gap  10 mEq/L mEq/L  





   (8-16)  


 


BUN  16 mg/dL mg/dL  





   (7-23)  


 


Creatinine  1.0 mg/dL mg/dL  





   (0.7-1.3)  


 


Estimated GFR  > 60   





   


 


Glucose  130 mg/dL H mg/dL  





   ()  


 


Calcium  9.7 mg/dL mg/dL  





   (8.5-10.4)  











Medications Given: 


 








Discontinued Medications





Fentanyl (Sublimaze)  100 mcg IVP EDNOW ONE


   Stop: 09/21/18 20:28


   Last Admin: 09/21/18 20:35 Dose:  100 mcg


Sodium Chloride (Ns)  1,000 mls @ 0 mls/hr IV EDNOW ONE; Wide Open


   PRN Reason: Protocol


   Stop: 09/21/18 20:38


   Last Admin: 09/21/18 20:37 Dose:  1,000 mls


Morphine Sulfate (Morphine)  4 mg IVP EDNOW ONE


   Stop: 09/21/18 21:29


   Last Admin: 09/21/18 21:31 Dose:  4 mg





Point of Care Test Results: 


 Urine Dip











Nitrites (Negative)            Negative


 


Blood (Negative)               Negative

















Departure





- Departure


Disposition: Home, Routine, Self-Care


Clinical Impression: 


 Constipation





Condition: Good


Instructions:  Constipation (ED)


Additional Instructions: 


I recommend he start taking 1 cap full of MiraLax daily while taking chronic 

opiates.  Additionally image try fleets enema at home which is over-the-counter 

to stimulate bowel movement.  Return to the ER for worsening symptoms.


Referrals: 


Kannan Car MD [Primary Care Provider] - As per Instructions

## 2018-10-19 NOTE — SOAPPROG
SOAP Progress Note


Assessment/Plan: 








Post Op Visit:





S: Awake and alert, NAD.  Pt with expected lower back pain


O: AFVSS/PERRLA/EOMI


no droop


CN 2-12 grossly intact


+lt touch


5/5 BUE/BLE =


CDI





A/P: 52 yo male that is s/p L2/3 lami/javy with hardware exploration


-orders in place


-call with any questions or concerns


-pt seen by Dr Gonzalez as well


-PT/OT


-ok for Eliquis POD #5


-RN updated





10/19/18 15:43





Objective: 





 Vital Signs











Temp Pulse Resp BP Pulse Ox


 


 36.8 C   69   18   133/96 H  92 


 


 10/19/18 10:42  10/19/18 12:18  10/19/18 12:18  10/19/18 12:18  10/19/18 12:18














ICD10 Worksheet


Patient Problems: 


 Problems











Problem Status Onset


 


BRIAN (acute kidney injury) Acute  


 


Arthrodesis status Acute  


 


Diabetes Acute  


 


HTN (hypertension) Acute  


 


Lumbago due to displacement of intervertebral disc Acute  


 


Lumbar stenosis Acute  


 


Pain Acute

## 2018-10-19 NOTE — POSTOPPROG
Post Op Note


Date of Operation: 10/19/18


Surgeon: Jai Gonzalez


Assistant: JESSE Ayala


Anesthesiologist: FIDEL Short MD


Anesthesia: GET(General Endotracheal), Local (Specify)


Pre-op Diagnosis: lumbar stenosis


Post-op Diagnosis: lumbar stenosis


Indication: leg pain


Procedure: L2/3 exploration/hardware exploration


Findings: none


Inf/Abcess present in the surg proc area at time of surgery?: No


Depth: Deep Incisional (Fascial)


EBL: 100-500


Total fluids administered: see anesthesia record


Complications: 





none

## 2018-10-19 NOTE — PDANEPAE
ANE History of Present Illness





Explore lumbar fusion hardware





ANE Past Medical History





- Cardiovascular History


Hx Hypertension: Yes


Hx Arrhythmias: No


Hx Chest Pain: No


Hx Coronary Artery / Peripheral Vascular Disease: No


Hx CHF / Valvular Disease: No


Hx Palpitations: No


Cardiovascular History Comment: hyperlipidemia





- Pulmonary History


Hx COPD: No


Hx Asthma/Reactive Airway Disease: No


Hx Recent Upper Respiratory Infection: No


Hx Oxygen in Use at Home: No


Hx Sleep Apnea: Yes


Sleep Apnea Screening Result - Last Documented: Positive


Pulmonary History Comment: dolores positive- uses cpap





- Neurologic History


Hx Cerebrovascular Accident: No


Hx Seizures: No


Hx Dementia: No


Neurologic History Comment: lumbar DDD





- Endocrine History


Hx Diabetes: Yes


Obesity: severe


Endocrine History Comment: type 2





- Renal History


Hx Renal Disorders: Yes


Renal History Comment: hx of issues emptying bladder





- Liver History


Hx Hepatic Disorders: No





- Neurological & Psychiatric Hx


Hx Neurological and Psychiatric Disorders: Yes


Neurological / Psychiatric History Comment: bipolar





- Cancer History


Hx Cancer: No





- Congenital Disorder History


Hx Congenital Disorders: Yes


Congenital History Comment: KIDNEY CONGENITAL ABNORMALITY





- GI History


GERD: mild


Hx Gastrointestinal Disorders: No


Gastrointestinal History Comment: Gas;





- Other Health History


Other Health History: 1 MISSING TOOTH-LOWER MOLAR.  wears glasses.  chronic 

pain.  gout





- Chronic Pain History


Chronic Pain: Yes (BACK PAIN & SHOULDER L; b leg)





- Surgical History


Prior Surgeries: 06/22/18 exploratory ALIF, redo L5-S1, L2-3 TLIF, PSF l2-s2 

with hardware removal with Carlos.  04/30/18 ct guided biopsy l5-s1 with García.

  03/31/17 anterior ALIF redo with Teddy.  10/07/15 l4-5, l5-s1 

fusion with christofer.  ING HERNIA REPAIR L.  KIDNEY SURG CHILDHOOD.  SHOULDER R 

REPLACEMENT.  VASECTOMY.  COLONOSCOPIES X2.  MASSES REMOVED FROM CHEST AREA





ANE Review of Systems


Review of Systems: 








- Exercise capacity


METS (RN): 3 METS





ANE Patient History





- Allergies


Allergies/Adverse Reactions: 








carbamazepine [From Tegretol] Allergy (Verified 10/15/18 15:38)


 Hives


ramelteon [From Rozerem] Allergy (Verified 10/15/18 15:38)


 Hives


vortioxetine hydrobromide [From Brintellix] Allergy (Verified 10/15/18 15:38)


 Hives


anticonvulsant Allergy (Uncoded 09/21/18 20:02)


 Hives








- Home Medications


Home Medications: 








Ascorbic Acid [Vitamin C 500 mg (*)] 1,000 mg PO DAILY@0700 09/17/15 [Last 

Taken 10/15/18]


Metoprolol Tartrate [Lopressor 100 mg (*)] 100 mg PO BID@07,18 09/17/15 [Last 

Taken 10/18/18]


amLODIPine BESYLATE [Norvasc 10 mg (*)] 10 mg PO DAILY@07 09/17/15 [Last Taken 

10/17/18]


clonazePAM [Klonopin (*)] 0.5 mg PO TID@0700,1400,1800 09/17/15 [Last Taken 10/

19/18 04:00]


Amantadine HCl [Symmetrel] 100 mg PO BID 02/17/17 [Last Taken 10/18/18]


Atorvastatin Calcium [Lipitor 20 mg (*)] 20 mg PO HS 02/17/17 [Last Taken 10/18/

18]


Dextroamphetamine/Amphetamine [Adderall 30 mg Tablet] 30 mg PO BID@07,12 02/17/ 17 [Last Taken 1 Week Ago ~10/12/18]


Divalproex Sodium [Depakote] 1,000 mg PO DAILY@16 02/17/17 [Last Taken 10/18/18]


Lisinopril [Zestril 40 mg (*)] 40 mg PO DAILY 02/17/17 [Last Taken 10/18/18]


Ziprasidone HCl [Geodon] 80 mg PO TID 02/17/17 [Last Taken 10/19/18 04:00]


metFORMIN HCL [Glucophage 1000 mg] 1,000 mg PO BIDMEAL 02/17/17 [Last Taken 10/

17/18]


Ipratropium 0.06% Nasal 1 spray EACHNARE TID 04/27/18 [Last Taken 10/19/18 04:00

]


Vraylar 4.5 mg PO DAILY@21 04/27/18 [Last Taken 10/18/18]


Acetaminophen [Tylenol  mg (*)] 1,000 mg PO BID@07,15 06/04/18 [Last 

Taken 10/19/18 07:00]


Cholecalciferol Vit D3 [Vitamin D3 (*)] 2,000 units PO DAILY 06/04/18 [Last 

Taken 10/17/18]


Divalproex [Depakote] 1,500 mg PO DAILY@20 06/04/18 [Last Taken 10/18/18]


Furosemide [Lasix 40 MG (*)] 40 mg PO DAILY PRN 06/04/18 [Last Taken 1 Week Ago 

~10/12/18]


Levothyroxine [Synthroid 50 mcg (*)] 50 mcg PO DAILY06 06/04/18 [Last Taken 10/

17/18]


Multivitamins [Multivitamin (*)] 1 each PO DAILY 06/04/18 [Last Taken 10/17/18]


Omega-3 Fatty Acids [Fish Oil 1000 mg (*)] 1,000 mg PO BID 06/04/18 [Last Taken 

10/17/18]


Zolpidem Tartrate [Ambien] 10 mg PO HS 06/04/18 [Last Taken 10/18/18]


Benztropine Mesylate 0.5 mg PO DAILY 10/12/18 [Last Taken 10/18/18]


Magnesium Citrate [Magnesium Citrate 300 ml (*)] 300 ml PO ONCE PRN 10/12/18 [

Last Taken 10/17/18]


Sennosides/Docusate Sodium [Senokot-S] 1 tab PO BID 10/12/18 [Last Taken 10/15/

18]


oxyCODONE IR [Oxycodone Ir (*)] 10 mg PO Q6HRS PRN 10/12/18 [Last Taken 10/19/

18 04:00]








- NPO status


NPO Status: no food or drink >8 hours


NPO Since - Liquids (Date): 10/19/18


NPO Since - Liquids (Time): 04:00


NPO Since - Solids (Date): 10/19/18


NPO Since - Solids (Time): 04:00





- Smoking Hx


Smoking Status: Former smoker





- Family Anes Hx


Family Hx Anesthesia Complications: NEG





ANE Labs/Vital Signs





- Vital Signs


Blood Pressure: 133/96


Heart Rate: 69


Respiratory Rate: 18


O2 Sat (%): 92


Height: 182.88 cm


Weight: 140.614 kg





ANE Physical Exam





- Airway


Neck exam: decreased ROM


Mallampati Score: Class 4


Mouth exam: poor dentition





- Pulmonary


Pulmonary: no respiratory distress, no rales or rhonchi





- Cardiovascular


Cardiovascular: regular rate and rhythym, no murmur, rub, or gallop





- ASA Status


ASA Status: III





ANE Anesthesia Plan


Anesthesia Plan: general endotracheal anesthesia

## 2018-10-19 NOTE — GOP
DATE OF OPERATION:  10/19/2018



SURGEON:  Jai Gonzalez MD



ASSISTANT:  Diane Marshall, PAC.



ANESTHESIA:  General.



PREOPERATIVE DIAGNOSIS:  

1.  L2-L3 left-sided epidural compressive lesion.

2.  Progressive lower extremity radiculopathy and intractable abdominal and leg 
pain.

3.  History of prior fusion, L2-S2.



POSTOPERATIVE DIAGNOSIS:  

1.  L2-L3 left-sided epidural compressive lesion.

2.  Progressive lower extremity radiculopathy and intractable abdominal and leg 
pain.

3.  History of prior fusion, L2-S2.



PROCEDURE PERFORMED:  

1.  Exploration and left L2/3 epidural lesion resection with decompression of 
spinal cord.

2.  Use of intraoperative 3D navigation. 

3.  Use of intraoperative fluoroscopy, less than 1 hour physician time.

4.  Use of neuromonitoring.



FINDINGS:  epidural compressive lesion at the left L2/3 level



SPECIMENS:  The epidural lesion was sent to pathology and microbiology for 
further analysis.



ESTIMATED BLOOD LOSS:  100 mL.



INDICATIONS:  This is a very pleasant gentleman, well known to me from prior 
multiple spinal fusions.  He presented to the emergency department with severe 
onset of abdominal pain and left lower extremity radiculopathy and weakness.  
Imaging demonstrated an epidural lesion, compressive in nature and calcified in 
the L2-L3 epidural space.  After discussion of the risks, benefits, and 
treatment alternatives, we decided to proceed with surgical exploration and 
resection of lesion.  He presents now for that surgical intervention.



DESCRIPTION OF PROCEDURE:  The patient was brought to the operating theater and 
underwent general endotracheal anesthesia without complication.  He had 
Venodynes, SHERI hose, and the appropriate lines placed by Anesthesia.  He was 
flipped prone on the Shaquille table and all bony processes inspected and padded.
  The upper aspect of the previous incision was marked and prepped and draped 
in the usual sterile surgical fashion.  A time-out was completed per protocol.  
The patient received antibiotics within 1 hour of incision. 



A small incision was made at the superior aspect of the incision and taken down 
the midline to the spinous process.  We attached the 3D Stealth navigation 
clamp to the spinous process.  We completed a 3D stealth navigation spin.  
Using 3D navigation, we then continued with our dissection to the L2-3 level on 
the left side and to to the epidural space.  The microscope was brought into 
the field to assist with microscopic dissection and maintain illumination and 
magnification.  The patient had evidence of an epidural compressive lesion 
which was white in nature and was fibrous in nature.  I continued with the 
dissection using the angled down pushing, up angled and straight curettes.  We 
continued with the dissection, we felt that everything was well decompressed.  
Additional 3D Stealth Navigation spin demonstrated some remnants of the lesion 
within that space, however I was concerned about causing a possible dural tear 
or spinal nerve injury and therefore left a small remnant shell of the lesion 
in hopes that this would allow the thecal sac to re expand within that space.  



Once I felt that everything was well decompressed, we obtained hemostasis with 
the bipolar.  The wound was irrigated copiously with bacitracin irrigation.  
The lesion was sent off to both pathology and microbiology for further 
analysis.  The wound was then closed in multiple layers using Vicryl sutures in 
deep layers and Dermabond for the skin.  The patient's wounds were dressed 
sterilely.  He was then flipped supine onto the transport cart, where he was 
awakened, extubated, and taken to recovery room in stable condition. 



There were no complications and no noted changes on neuromonitoring throughout 
the procedure.



The use of the surgical assistant was important to retract and protect the 
critical tissues during surgery.



COMPLICATION:  None.





Job #:  [id number]

MTDD

## 2018-10-19 NOTE — PDHPUP
History & Physical Update


H&P update statement: 


This history and physical update is based on an assessment of the patient which 

was completed after admission or registration (within 24 hours), but prior to 

the surgery/procedure.





H&P update: H&P reviewed & patient examined, no change in patient's condition 

since H&P completed (Consents signed and site marked.  All questions answered.  

)

## 2018-10-19 NOTE — PDMN
Medical Necessity


Medical necessity: [facility] IP only surgery; cpt 99547 Lum Lami 68131 Removal of 

Posterior Spinal Instrumentation (L2/3 Lami w/hardware exploration)

## 2018-10-20 NOTE — ASMTCMCOM
CM Note

 

CM Note                       

Notes:

Pt is s/p a planned L2/3 laminectomy and decompression. PT is recommending home care. Discussed 

with pt and he would like to have [facility]. Referral sent via [facility]. Pt's mother will be 

staying with him after he discharges home. CM will continue to follow for d/c needs. 

 

Date Signed:  10/20/2018 02:11 PM

Electronically Signed By:LUIS Casanova

## 2018-10-20 NOTE — NEUSURGPN
Date of Surgery: 10/19/18


Post Op Day: 1


Assessment/Plan: 


Assessment: 54 yo male that is s/p L2/3 lami/javy with hardware exploration POD #

1





Plan:


-s/p L spine exploration and decompression-pt with expected lower back pain-LE 

better


-pt states that BLE feel fine with improvement of pain but continued numbness 

that is better overall


-orders in place


-PT/OT-CPM


-plan to work with therapies today and pain control with plans for dc tomorrow


-will await for PT/OT/Case management to see what his needs are after dc


-call with any questions or concerns


-ok for Eliquis POD #5


-RN updated


-pt understands and agrees


Subjective: 


Awake and alert.  NAD.  Eating/drinking and voiding.  No ha/neck/chest/abd or 

gu complaints.  


Objective: 


AAO x 3, PERRLA/EOMI


no droop


CN 2-12 grossly intact


+lt touch


5/5 BUE/BLE =


CDI


Neuro Check Frequency: per routine


Urinary Catheter in Place: No





- Physician


Discussed Patient with : Carlos





Neurosurgery Physical Exam





- Vitals, I&O, Labs





 I and O











 10/19/18 10/20/18 10/21/18





 05:59 05:59 05:59


 


Intake Total  3610 


 


Output Total  2600 


 


Balance  1010 


 


Weight  140.614 kg 


 


Intake:   


 


  Oral (ml)  500 


 


  IV Intake (ml)  3110 


 


Output:   


 


  Urine (ml)  2500 


 


    Catheter  500 


 


    Toilet  2000 


 


  Estimated Blood Loss (ml)  100 


 


Other:   


 


  Intake Quantity  Yes 





  Sufficient   


 


  Bladder Scan Volume (ml)  775 








 Microbiology











 10/19/18 14:18 Gram Stain - Final





 Back - Tissue 








 Vital Signs











Temp Pulse Resp BP Pulse Ox


 


 36.8 C   68   18   135/79 H  97 


 


 10/20/18 03:57  10/20/18 06:42  10/20/18 03:57  10/20/18 06:42  10/20/18 03:57








 Laboratory Results





 10/20/18 04:25 





 10/20/18 04:25 











ICD10 Worksheet


Patient Problems: 


 Problems











Problem Status Onset


 


BRIAN (acute kidney injury) Acute  


 


Arthrodesis status Acute  


 


Diabetes Acute  


 


HTN (hypertension) Acute  


 


Lumbago due to displacement of intervertebral disc Acute  


 


Lumbar stenosis Acute  


 


Pain Acute

## 2018-10-21 NOTE — NEUSURGPN
Date of Surgery: 10/19/18


Post Op Day: 2


Assessment/Plan: 


Assessment: 52 yo male that is s/p L2/3 lami/javy with hardware exploration POD #

2





Plan:


-s/p L spine exploration and decompression-pt with expected lower back pain-LE 

better


-pt states that BLE feel fine with improvement of pain but continued numbness 

that is better overall as well


-orders in place


-K was 5.7-pending K this am


-PT/OT-CPM


-plan to work with therapies today with plans for dc today


-PT/OT/Case management-HHC arranged


-call with any questions or concerns


-ok for Eliquis POD #5-pt walking well in hallway


-RN updated


-pt understands and agrees


Subjective: 


Awake and alert.  NAD.  Eating/drinking and voiding.  No f/c/n/v/d.  No ha/neck/

chest/abd or gu complaints.  


Objective: 


AAO x 3, PERRLA/EOMI


no droop


CN 2-12 grossly intact


+lt touch


5/5 BUE/BLE =


CDI


Neuro Check Frequency: per routine


Urinary Catheter in Place: No





- Physician


Discussed Patient with Dr.: Carlos





Neurosurgery Physical Exam





- Vitals, I&O, Labs





 I and O











 10/20/18 10/21/18 10/22/18





 05:59 05:59 05:59


 


Intake Total 3610 450 500


 


Output Total 2600 2750 800


 


Balance 1010 -2300 -300


 


Weight 140.614 kg  


 


Intake:   


 


  Oral (ml) 500 450 500


 


  IV Intake (ml) 3110  


 


Output:   


 


  Urine (ml) 2500 2750 800


 


    Catheter 500  


 


    Toilet 2000 2750 800


 


  Estimated Blood Loss (ml) 100  


 


Other:   


 


  Intake Quantity Yes  





  Sufficient   


 


  Number of Voids   


 


    Toilet  1 


 


  Bladder Scan Volume (ml) 775  








 Microbiology











 10/19/18 14:18 Mycobacterial Smear (NATHAN) - Final





 Back - Tissue 


 


 10/19/18 14:18 Gram Stain - Final





 Back - Tissue 








 Vital Signs











Temp Pulse Resp BP Pulse Ox


 


 36.9 C   82   16   131/71 H  93 


 


 10/21/18 02:56  10/21/18 02:56  10/21/18 02:56  10/21/18 02:56  10/21/18 02:56








 Laboratory Results





 10/20/18 04:25 





 10/20/18 04:25 











ICD10 Worksheet


Patient Problems: 


 Problems











Problem Status Onset


 


BRIAN (acute kidney injury) Acute  


 


Arthrodesis status Acute  


 


Diabetes Acute  


 


HTN (hypertension) Acute  


 


Lumbago due to displacement of intervertebral disc Acute  


 


Lumbar stenosis Acute  


 


Pain Acute

## 2018-10-21 NOTE — ASDISCHSUM
----------------------------------------------

Discharge Information

----------------------------------------------

Plan Status:Home with Home Health                    Medically Cleared to Leave:10/21/2018

Discharge Date:10/21/2018                            CM D/C Disposition:Home Health Service

ADT D/C Disposition:HHSNOTBCH                        Projected Discharge Date:10/21/2018 11:00 AM

Transportation at D/C:Family                         Discharge Delay Reason:

Follow-Up Date:10/21/2018 11:00 AM                   Discharge Slot:1 - 8:01 am - 12:00 noon

Final Diagnosis:S/P Lami L2/3 hardware exploration

----------------------------------------------

Placement Information

----------------------------------------------

Referral Type:*Home Health Care Services             Referral ID:[id number]

Provider Name:[facility] [place]

Address 1:[address] [place] [address]             Phone [phone]

Address 2:                                           Fax [phone]

City:[place]                                          Selection Factors:

State:[place]

 

----------------------------------------------

Patient Contact Information

----------------------------------------------

Contact Name:NITIN                       Relationship:Daughter

Address:                                             Home [phone]

                                                     Work Phone:

City:                                                [facility] Phone:

[facility]/Zip Code:                                      Email:

----------------------------------------------

Financial Information

----------------------------------------------

Financial Class:Medicare

Primary Plan Desc:MEDICARE INPATIENT                 Primary Plan Number:[id number]

Secondary Plan Desc:KYLE BC INDEMNI              Secondary Plan Number:[id number]

 

 

----------------------------------------------

Assessment Information

----------------------------------------------

----------------------------------------------

LACE

----------------------------------------------

LACE

 

Length of stay for            Answers:  2 days                                

current admission                                                             

Acuity / Level of             Answers:  Yes                                   

Care: Did the patient                                                         

have an inpatient                                                             

admission?                                                                    

Comorbidities - select        Answers:  Diabetes (uncontrolled or             

all that apply                          controlled)                           

                                        Other                         Notes:  sleep apnea, HTN, kidne
y 

                                                                              disease

# of Emergency department     Answers:  1-2                                   

visits in the last 6                                                          

months                                                                        

Social determinants           Answers:  Mental health diagnosis               

                                        (anxiety, depression, pers            

                                        onality disorders, etc.)              

Score: 11

 

Date Signed:  10/21/2018 09:22 AM

Electronically Signed By:Camille Up LCSW

 

 

----------------------------------------------

[facility] CM Progress Note

----------------------------------------------

CM Note

 

CM Note                       

Notes:

Pt is s/p a planned L2/3 laminectomy and decompression. PT is recommending home care. Discussed 

with pt and he would like to have [facility]. Referral sent via [employer]. Pt's mother will be 

staying with him after he discharges home. CM will continue to follow for d/c needs. 

 

Date Signed:  10/20/2018 02:11 PM

Electronically Signed By:LUIS Casanova

 

 

----------------------------------------------

Case Management Discharge Plan Note

----------------------------------------------

Case Management Discharge

 

Discharge Order Complete?     Answers:  Yes                                   

Patient to Obtain             Answers:  via Family                            

Medications                                                                   

Transportation Arranged       Answers:  Family/Friends                        

Transport will Pick (Date     10/21/2018 11:00 AM

& Time)                       

Faxed Final Orders            Answers:  Yes                           Notes:  [facility]

Family Notified               Answers:  Yes                           Notes:  Mother to transport

Discharge Comments            

Notes:

Patient has been discharged. To go home with [facility]. Mother to stay with him.

 

Date Signed:  10/21/2018 09:21 AM

Electronically Signed By:Camille Up LCSW

 

 

----------------------------------------------

Intervention Information

----------------------------------------------

## 2018-10-21 NOTE — ASMTDCNOTE
Case Management Discharge

 

Discharge Order Complete?     Answers:  Yes                                   

Patient to Obtain             Answers:  via Family                            

Medications                                                                   

Transportation Arranged       Answers:  Family/Friends                        

Transport will Pick (Date     10/21/2018 11:00 AM

& Time)                       

Faxed Final Orders            Answers:  Yes                           Notes:  [facility]

Family Notified               Answers:  Yes                           Notes:  Mother to transport

Discharge Comments            

Notes:

Patient has been discharged. To go home with [facility]. Mother to stay with him.

 

Date Signed:  10/21/2018 09:21 AM

Electronically Signed By:Camille Up LCSW

## 2018-10-21 NOTE — ASMTLACE
LACE

 

Length of stay for            Answers:  2 days                                

current admission                                                             

Acuity / Level of             Answers:  Yes                                   

Care: Did the patient                                                         

have an inpatient                                                             

admission?                                                                    

Comorbidities - select        Answers:  Diabetes (uncontrolled or             

all that apply                          controlled)                           

                                        Other                         Notes:  sleep apnea, HTN, kidne
y 

                                                                              disease

# of Emergency department     Answers:  1-2                                   

visits in the last 6                                                          

months                                                                        

Social determinants           Answers:  Mental health diagnosis               

                                        (anxiety, depression, pers            

                                        onality disorders, etc.)              

Score: 11

 

Date Signed:  10/21/2018 09:22 AM

Electronically Signed By:Camille Up LCSW

## 2018-10-21 NOTE — PDIAF
- Diagnosis


Diagnosis: s/p SAKSHI/decompression L2/3


Code Status: Full Code





- Medication Management


Discharge Medications: 


 Medications to Continue on Transfer





Ascorbic Acid [Vitamin C 500 mg (*)] 1,000 mg PO DAILY@0700 09/17/15 [Last 

Taken 10/15/18]


Metoprolol Tartrate [Lopressor 100 mg (*)] 100 mg PO BID@07,18 09/17/15 [Last 

Taken 10/18/18]


amLODIPine BESYLATE [Norvasc 10 mg (*)] 10 mg PO DAILY@07 09/17/15 [Last Taken 

10/17/18]


clonazePAM [Klonopin (*)] 0.5 mg PO TID@0700,1400,1800 09/17/15 [Last Taken 10/

19/18 04:00]


Amantadine HCl [Symmetrel] 100 mg PO BID 02/17/17 [Last Taken 10/18/18]


Atorvastatin Calcium [Lipitor 20 mg (*)] 20 mg PO HS 02/17/17 [Last Taken 10/18/

18]


Dextroamphetamine/Amphetamine [Adderall 30 mg Tablet] 30 mg PO BID@07,12 02/17/ 17 [Last Taken 1 Week Ago ~10/12/18]


Divalproex Sodium [Depakote] 1,000 mg PO DAILY@16 02/17/17 [Last Taken 10/18/18]


Ziprasidone HCl [Geodon] 80 mg PO TID 02/17/17 [Last Taken 10/19/18 04:00]


metFORMIN HCL [Glucophage 1000 mg] 1,000 mg PO BIDMEAL 02/17/17 [Last Taken 10/

17/18]


Ipratropium 0.06% Nasal 1 spray EACHNARE TID 04/27/18 [Last Taken 10/19/18 04:00

]


Vraylar 4.5 mg PO DAILY@21 04/27/18 [Last Taken 10/18/18]


Acetaminophen [Tylenol  mg (*)] 1,000 mg PO BID@07,15 06/04/18 [Last 

Taken 10/19/18 07:00]


Cholecalciferol Vit D3 [Vitamin D3 (*)] 2,000 units PO DAILY 06/04/18 [Last 

Taken 10/17/18]


Divalproex [Depakote] 1,500 mg PO DAILY@20 06/04/18 [Last Taken 10/18/18]


Furosemide [Lasix 40 MG (*)] 40 mg PO DAILY PRN 06/04/18 [Last Taken 1 Week Ago 

~10/12/18]


Levothyroxine [Synthroid 50 mcg (*)] 50 mcg PO DAILY06 06/04/18 [Last Taken 10/

17/18]


Multivitamins [Multivitamin (*)] 1 each PO DAILY 06/04/18 [Last Taken 10/17/18]


Omega-3 Fatty Acids [Fish Oil 1000 mg (*)] 1,000 mg PO BID 06/04/18 [Last Taken 

10/17/18]


Zolpidem Tartrate [Ambien] 10 mg PO HS 06/04/18 [Last Taken 10/18/18]


Gabapentin [Neurontin 300 MG (*)] 600 mg PO TID@0700,1400,2100 #90 cap 06/29/18 

[Last Taken 10/19/18 04:00]


Methocarbamol [Robaxin 750 mg (*)] 750 mg PO QID PRN #60 tab 06/29/18 [Last 

Taken 10/19/18 04:00]


Benztropine Mesylate 0.5 mg PO DAILY 10/12/18 [Last Taken 10/18/18]


Magnesium Citrate [Magnesium Citrate 300 ml (*)] 300 ml PO ONCE PRN 10/12/18 [

Last Taken 10/17/18]


Apixaban [Eliquis] 5 mg PO BID #60 tab 10/21/18 [Last Taken 10/17/18]


Enoxaparin [Lovenox 40 MG (*)] 40 mg SC BID #5 syr 10/21/18 [Last Taken Unknown]


Methocarbamol [Robaxin 750 mg (*)] 750 mg PO QID PRN #40 tab 10/21/18 [Last 

Taken Unknown]


Sennosides/Docusate Sodium [Senokot-S] 1 tab PO BID #30 tab 10/21/18 [Last 

Taken Unknown]


Tamsulosin HCl [Flomax 0.4 MG (*)] 0.4 mg PO HS 5 Days #7 cap 10/21/18 [Last 

Taken Unknown]


oxyCODONE IR [Oxycodone Ir (*)] 10 mg PO Q4H PRN #60 tab 10/21/18 [Last Taken 

Unknown]








Long Term Antibiotics: none


Additional Medication Instructions: Start Eliquis again on 10/24.  hold 

lisinopril and follow up with Dr Car for a recheck


Discharge Medications: Refer to the Discharge Home Medication list for PRN 

reason.


PICC Care - Routine: N/A





- Orders


Services needed: Home Care, Physical Therapy, Occupational Therapy


Home Care Face to Face: I certify that this patient was under my care and that 

I had the required face-to-face encounter meeting the encounter requirements on 

the discharge day.  My findings support the fact that the patient is homebound 

as defined in


Home Care Face to Face Continued: CMS Chapter 7 Medicare Benefits Manual 30.1.1

, The condition of the patient is such that there exists a normal inability to 

leave home and consequently, leaving home would require a considerable and 

taxing effort.


Isolation Type: None


Oxygen: to keep O2 sat above 90%


Diet Recommendation: no restrictions on diet


Diet Texture: Regular Texture Diet


Tube feeding: n/a


Marquez: Not applicable


Additional Instructions: 


HOLD THE LISINOPRIL


Take medications as directed


See Dr Car this week for a recheck of your blood pressure medications as well 

as a recheck of your potassium


No bending or twisting


Resume Eliquis on 10/24-wed





- Follow Up Care


Current Providers and Referrals: 


Kannan Car MD [Primary Care Provider] -  (follow up this week )


Jai Gonzalez MD [Medical Doctor] -  (follow up in 2 weeks)

## 2018-10-26 NOTE — GDS
ADMITTING DIAGNOSIS:  Lumbar radiculopathy.



DISCHARGE DIAGNOSIS:  Status post L2-L3 laminectomy and diskectomy with hardware exploration.



DISPOSITION:  To home with home health care.



HOSPITAL COURSE:  The patient is a 53-year-old male patient who was seen in our clinic as an outpatie
nt and has previously undergone several multiple spinal fusions with both Dr. Sanford and with Dr. Juvenal de la fuente.  Most recent fusion surgery included extension of the fusion up to L2 through S2 with TLIF and d
ecompression at the L2-3 level.  The patient presented to the emergency room recently with severe ons
et of abdominal pain and left lower extremity radiculopathy.  He then began to develop weakness.  Jen
ging demonstrated an epidural lesion that was compressive and calcified in the L2-3 epidural space.  
After discussion of risks, benefits and treatment alternatives, the patient elected to proceed forth 
with surgical intervention in the way of a lumbar hardware exploration with resection of L2-3 left-si
ded compressive lesion and redo decompression.  The procedure was performed by Dr. Jai Gonzalez, for
 which there were no known complications.  Please see his operative report for further details.  Afte
r the operation, the patient once in stable condition was transferred from the operating room to the 
PACU and from the PACU to the postsurgical floor.  While on the floor, patient received physical ther
apy, occupational therapy, pain management, and DVT prophylaxis.  All drains and catheters were remov
ed prior to discharge.  On October 21, 2018, the patient's pain was well managed.  He was cleared by 
therapies and he was subsequently discharged to home with home health care.  Surgical specimen was ob
tained at the time of surgery and pathology was consistent with disk material.



DISCHARGE INSTRUCTIONS:  Diet as tolerated.  

Activity:  No bending, lifting, or twisting.  

Patient to keep his incision clean and dry and well covered.



MEDICATIONS:  Please see the medication reconciliation.  Patient was instructed to resume his Eliquis
 on postoperative day number #5.



FOLLOWUP:  The patient has been asked to follow up with Dr. Gonzalez in the office in approximately 2-3
 weeks.  We have asked the patient to call sooner with any additional questions or concerns.





Job #:  [id number]

## 2019-06-12 ENCOUNTER — HOSPITAL ENCOUNTER (OUTPATIENT)
Dept: HOSPITAL 80 - EMCIMAGING | Age: 54
End: 2019-06-12
Payer: COMMERCIAL

## 2022-03-11 NOTE — SOAPPROG
SOAP Progress Note


Assessment/Plan: 


Assessment:





52 y/o M s/o redo anterior/posterior spinal fusion POD #2





S: Laying in bed, no complaints.  Reports passing minimal gas, but no BM.  

Denies abdominal pain. 





O: Sleepy


Kidney function improving, Cr down to 1.4, K improved as well


HR in 80s during visit


BP improved


Urine output improved


Abdomen: soft, but distended, hypoactive bowel sounds, mild shadowing on 

dressing





Plan: Continue clear liquid diet.  1u prbc per neurosurg.





06/25/18 08:54





Objective: 





 Vital Signs











Temp Pulse Resp BP Pulse Ox


 


 37.5 C   110 H  15   140/73 H  98 


 


 06/25/18 08:10  06/25/18 08:10  06/25/18 08:10  06/25/18 08:10  06/25/18 08:10








 Microbiology











 06/22/18 10:53 Gram Stain - Final





 Back - Other 


 


 06/22/18 10:16 Gram Stain - Final





 Other - Other 








 Laboratory Results





 06/25/18 04:30 





 06/25/18 04:30 





 











 06/24/18 06/25/18 06/26/18





 05:59 05:59 05:59


 


Intake Total 3606 5285 


 


Output Total 1330 2955 


 


Balance 2276 2330 








 











PT  13.8 SEC (12.0-15.0)   06/22/18  16:35    


 


INR  1.04  (0.83-1.16)   06/22/18  16:35    














ICD10 Worksheet


Patient Problems: 


 Problems











Problem Status Onset


 


BRIAN (acute kidney injury) Acute  


 


Arthrodesis status Acute  


 


Diabetes Acute  


 


HTN (hypertension) Acute  


 


Lumbago due to displacement of intervertebral disc Acute  


 


Lumbar stenosis Acute  


 


Pain Acute

## 2024-10-07 NOTE — EDPHY
H & P


Time Seen by Provider: 09/23/18 11:48


HPI/ROS: 





CHIEF COMPLAINT:  Left abdominal and back pain





HISTORY OF PRESENT ILLNESS:  Patient presents to the emergency department today 

with continued left abdominal and back pain.  He was here on 9/21/2018 and had 

similar symptoms.  He had noncontrast CT that showed constipation and no kidney 

stones or diverticulitis or other abnormality.  He took magnesium citrate and 

has been having diarrhea.  He presents today with continued left groin pain 

radiating to his back.  It is better lying down and worse with certain 

movements.  It is not associated with vomiting, he has some diarrhea because he 

took laxative, but does not have any relation of the diarrhea to the pain.  No 

blood in the diarrhea.


Pain is not associated with urinary symptoms or weakness or numbness in the 

legs.  No recent injury or trauma.  No skin rash or fever.  Symptoms moderate 

and he took oxycodone and used ice pack with relief.





REVIEW OF SYSTEMS:


Eye: no change in vision


ENT: no sore throat


Cardiac: no chest pain or syncope


Pulmonary: no cough or SOB


Abdomen:  HPI


Musculoskeletal:  HPI


Skin: no rash


Neuro: no headache


Constitutional: no fever


: no urinary symptoms or genitourinary symptoms, no testicular pain.  No 

incontinence





A comprehensive 10 point review of systems is otherwise negative aside from 

elements mentioned in the history of present illness.





PAST MEDICAL HISTORY:  Previous L2- S1 spinal fusion, bipolar disorder, 

hypertension, diabetes.  Sleep apnea, right arm DVT after back surgery.





Social history:  Here with his mother





General Appearance: Alert and conversant, cooperative.


Eyes: No scleral  icterus. 


ENT, Mouth: Normal mucous membranes.


Respiratory: Normal respiratory effort, breath sounds equal, lungs are clear to 

auscultation.


Cardiovascular:  Regular rate and rhythm.


Gastrointestinal:  Abdomen is soft and non tender.  No pulsatile mass, no 

peritoneal signs, no inguinal hernia palpated.  Pain does not change with 

palpation of his abdomen.  Normal male .


Neurological: Alert, face symmetric, normal motor and sensory in extremities.   

Patellar reflexes 2+ symmetric, no clonus.  Straight leg raising negative 

bilaterally.  Ambulatory.


Skin: Warm and dry, no rashes.  No zoster.


Musculoskeletal: No peripheral edema.  No spinal tenderness.


Psychiatric: Not agitated.





Emergency Department course/MDM:


1207: Discussed with Preston Gonzalez; will see in the office this week.


I think this patient is more likely to have L1 radiculopathy.  The distribution 

of his pain is in the appropriate area, it is worse with movements, it is 

better with sitting down.


He is normal abdominal exam and a negative noncontrast CT last visit.


I think that arterial or venous clot is unlikely.  He does not look toxic.  He 

does not have neurologic deficit.


I think that with bipolar disorder and diabetes the potential risk of steroids 

outweighs potential benefit.


He says will be able to have adequate pain control with his current medications 

and ice pack, will see Neurosurgery in the office this week.


Smoking Status: Former smoker


Constitutional: 


 Initial Vital Signs











Temperature (C)  36.7 C   09/23/18 11:45


 


Heart Rate  80   09/23/18 11:45


 


Respiratory Rate  16   09/23/18 11:45


 


Blood Pressure  158/98 H  09/23/18 11:45


 


O2 Sat (%)  94   09/23/18 11:45








 











O2 Delivery Mode               Room Air














Allergies/Adverse Reactions: 


 





carbamazepine [From Tegretol] Allergy (Verified 09/23/18 11:48)


 Hives


ramelteon [From Rozerem] Allergy (Verified 09/23/18 11:48)


 Hives


vortioxetine hydrobromide [From Brintellix] Allergy (Verified 09/23/18 11:48)


 Hives


anticonvulsant Allergy (Uncoded 09/21/18 20:02)


 Hives








Home Medications: 














 Medication  Instructions  Recorded


 


Ascorbic Acid [Vitamin C 500 mg 1,000 mg PO DAILY@0700 09/17/15





(*)]  


 


Benztropine Mesylate [Cogentin] 0.5 mg PO DAILY 09/17/15


 


Metoprolol Tartrate [Lopressor 100 100 mg PO BID@07,18 09/17/15





mg (*)]  


 


amLODIPine BESYLATE [Norvasc 10 mg 10 mg PO DAILY@07 09/17/15





(*)]  


 


clonazePAM [Klonopin (*)] 0.5 mg PO TID@0700,1400,1800 09/17/15


 


Amantadine HCl [Symmetrel] 100 mg PO BID 02/17/17


 


Atorvastatin Calcium [Lipitor 20 20 mg PO HS 02/17/17





mg (*)]  


 


Dextroamphetamine/Amphetamine 30 mg PO BID@07,12 02/17/17





[Adderall 30 mg Tablet]  


 


Divalproex Sodium [Depakote] 1,000 mg PO DAILY@16 02/17/17


 


Lisinopril [Zestril 40 mg (*)] 40 mg PO DAILY 02/17/17


 


Melatonin 5 mg PO HS 02/17/17


 


Ziprasidone HCl [Geodon] 80 mg PO TID 02/17/17


 


metFORMIN HCL [Glucophage 1000 mg] 1,000 mg PO BIDMEAL 02/17/17


 


Doxazosin Mesylate [Cardura 4 MG 8 mg PO DAILY 04/27/18





(*)]  


 


Ipratropium 0.06% Nasal 1 spray EACHNARE TID 04/27/18


 


Vraylar 4.5 mg PO DAILY@21 04/27/18


 


Acetaminophen [Tylenol  mg 1,000 mg PO BID@07,15 06/04/18





(*)]  


 


C/E/Zn/Cu/OM3/DHA/EPA/LUT/ZEAX 1 each PO BID 06/04/18





[Preservision Areds 2 Softgel]  


 


Cholecalciferol Vit D3 [Vitamin D3 2,000 units PO DAILY 06/04/18





(*)]  


 


Divalproex [Depakote] 1,500 mg PO DAILY@08 06/04/18


 


Furosemide [Lasix 40 MG (*)] 40 mg PO DAILY PRN 06/04/18


 


Levothyroxine [Synthroid 50 mcg 50 mcg PO DAILY06 06/04/18





(*)]  


 


Multivitamins [Multivitamin (*)] 1 each PO DAILY 06/04/18


 


Omega-3 Fatty Acids [Fish Oil 1000 1,000 mg PO BID 06/04/18





mg (*)]  


 


Zolpidem Tartrate [Ambien] 10 mg PO HS 06/04/18


 


tiZANidine HCL [Zanaflex] 4 mg PO BID@07,15 06/04/18


 


Apixaban [Eliquis] 5 mg PO BID #60 tab 06/29/18


 


Apixaban [Eliquis] 10 mg PO BID #14 tab 06/29/18


 


Gabapentin [Neurontin 300 MG (*)] 600 mg PO TID@0700,1400,2100 #90 06/29/18





 cap 


 


Methocarbamol [Robaxin 750 mg (*)] 750 mg PO QID PRN #60 tab 06/29/18


 


Sennosides/Docusate Sodium 1 - 2 tab PO BID  tab 06/29/18





[Senokot-S]  


 


oxyCODONE IR [Oxycodone Ir (*)] 5 - 10 mg PO Q4HRS PRN #90 tab 06/29/18














Medical Decision Making


Differential Diagnosis: 





Differential considered including but not limited to diverticulitis, surgical 

abdominal process, arterial occlusion, venous occlusion, cauda equina syndrome, 

shingles, bowel obstruction, epididymitis or torsion.





Departure





- Departure


Disposition: Home, Routine, Self-Care


Clinical Impression: 


 Radiculopathy of lumbar region





Back pain


Qualifiers:


 Back pain location: low back pain Chronicity: acute Back pain laterality: left 

Sciatica presence: unspecified whether sciatica present Qualified Code(s): 

M54.5 - Low back pain





Condition: Good


Instructions:  Lumbar Radiculopathy (ED)


Additional Instructions: 


No more laxatives.


Return for worsening or severe pain or weakness or numbness in your legs.


Referrals: 


Kannan Car MD [Primary Care Provider] - As per Instructions


Jai Gonzalez MD [Medical Doctor] - As per Instructions (see your surgeon in 

the office this week) Pt reports no relief in pain after ofirmev. Dr. Durant aware, will medicate per orders in MAR.